# Patient Record
Sex: MALE | Race: BLACK OR AFRICAN AMERICAN | NOT HISPANIC OR LATINO | URBAN - METROPOLITAN AREA
[De-identification: names, ages, dates, MRNs, and addresses within clinical notes are randomized per-mention and may not be internally consistent; named-entity substitution may affect disease eponyms.]

---

## 2022-01-20 VITALS
HEART RATE: 93 BPM | DIASTOLIC BLOOD PRESSURE: 89 MMHG | OXYGEN SATURATION: 100 % | HEIGHT: 72 IN | RESPIRATION RATE: 16 BRPM | SYSTOLIC BLOOD PRESSURE: 166 MMHG | TEMPERATURE: 97 F | WEIGHT: 220.9 LBS

## 2022-01-20 RX ORDER — CHLORHEXIDINE GLUCONATE 213 G/1000ML
1 SOLUTION TOPICAL ONCE
Refills: 0 | Status: DISCONTINUED | OUTPATIENT
Start: 2022-02-16 | End: 2022-02-17

## 2022-01-20 NOTE — H&P ADULT - NSICDXPASTMEDICALHX_GEN_ALL_CORE_FT
PAST MEDICAL HISTORY:  CAD (coronary artery disease)     Chronic diastolic congestive heart failure     HTN (hypertension)     PAD (peripheral artery disease)

## 2022-01-20 NOTE — H&P ADULT - HISTORY OF PRESENT ILLNESS
COVID: [facility]   Cardiologist: Dr. Chen   Pharmacy: [facility] [phone]  Escort:     71 Y M former smoker with pmh PMH afib (Eliquis last dose____), HLD, CAD, HFpEF, PAD (s/p LLE SFA/pop/DANIE/pop/DANIE PTA, PCI SFA, DCB SFA and pop 12/23/2021 @[facility] cardiology office; RSFA severe disease BTK) who presented to their cardiologist c/o R severe calf pain/heaviness with ambulation 1-3 blocks for days. Symptoms are impairing walking/working/performing ADLs.  Pt also endorsing intermittent substernal, non radiating gradually worsening chest rpessure with moderate exertion x few weeks.   Denies ZAPATA, orthopnea, PND, diaphoresis, palpitations, dizziness, syncope, N/V/D, fever, chills, melena, hematuria.     In light of risk factors, [place] category 4, grade 2 symptoms, and known PAD pt recommended for peripheral angiogram.     Peripheral cath 12/23/21: R external iliac <30%, L external iliac 70-80%, L pSFA 100%, mSFA 80-90%, dSFA 80%, L pop 80-90% calcified, L pATA 90%, L PTA mild diffuse, L peroneal mild diffuse. Successful L sided atherectomy/PTA SFA/pop/DANIE/pop/DANIE , PCI SFA, DCB SFA and pop.    COVID: [facility] negative in chart  Cardiologist: Dr. Chen   Pharmacy: [facility] [phone]  Escort: TBD    71 Y M former smoker with pmh PMH afib (Eliquis last dose 02/14), HLD, CAD, HFpEF, PAD (s/p LLE SFA/pop/DANIE/pop/DANIE PTA, PCI SFA, DCB SFA and pop [facility] RSFA severe disease BTK) who presented to their cardiologist c/o R severe calf pain/heaviness with ambulation 1-3 blocks for days. Symptoms are impairing walking/working/performing ADLs.  Pt also endorsing intermittent substernal, non radiating gradually worsening chest rpessure with moderate exertion x few weeks.   Denies ZAPATA, orthopnea, PND, diaphoresis, palpitations, dizziness, syncope, N/V/D, fever, chills, melena, hematuria.     In light of risk factors, Arik category 4, grade 2 symptoms, and known PAD pt recommended for peripheral angiogram.     Peripheral cath 12/23/21: R external iliac <30%, L external iliac 70-80%, L pSFA 100%, mSFA 80-90%, dSFA 80%, L pop 80-90% calcified, L pATA 90%, L PTA mild diffuse, L peroneal mild diffuse. Successful L sided atherectomy/PTA SFA/pop/DANIE/pop/DANIE , PCI SFA, DCB SFA and pop.    COVID: [facility] negative in chart  Cardiologist: Dr. Chen   Pharmacy: [facility] [phone]  Escort: friend     71 Y M former smoker with pmh PMH afib (Eliquis last dose 02/14), HLD, CAD, HFpEF, PAD (s/p LLE SFA/pop/DANIE/pop/DANIE PTA, PCI SFA, DCB SFA and pop 12/23/2021 @[facility] cardiology office; RSFA severe disease BTK) who presented to their cardiologist c/o R severe calf pain/heaviness with ambulation 1-3 blocks for days. Symptoms are impairing walking/working/performing ADLs.  Pt also endorsing intermittent substernal, non radiating gradually worsening chest rpessure with moderate exertion x few weeks.   Denies ZAPATA, orthopnea, PND, diaphoresis, palpitations, dizziness, syncope, N/V/D, fever, chills, melena, hematuria.     In light of risk factors, [place] category 4, grade 2 symptoms, and known PAD pt recommended for peripheral angiogram.     Peripheral cath 12/23/21: R external iliac <30%, L external iliac 70-80%, L pSFA 100%, mSFA 80-90%, dSFA 80%, L pop 80-90% calcified, L pATA 90%, L PTA mild diffuse, L peroneal mild diffuse. Successful L sided atherectomy/PTA SFA/pop/DANIE/pop/DANIE , PCI SFA, DCB SFA and pop.

## 2022-01-20 NOTE — H&P ADULT - RS GEN PE MLT RESP DETAILS PC
MEDICATIONS  (STANDING):  aspirin enteric coated 81 milliGRAM(s) Oral daily  clopidogrel Tablet 75 milliGRAM(s) Oral daily  levothyroxine 75 MICROGram(s) Oral daily    MEDICATIONS  (PRN):  ALBUTerol    90 MICROgram(s) HFA Inhaler 2 Puff(s) Inhalation every 6 hours PRN wheezing  clonazePAM  Tablet 1 milliGRAM(s) Oral at bedtime PRN insomnia  clonazePAM  Tablet 1 milliGRAM(s) Oral at bedtime PRN severe insomnia  OLANZapine 2.5 milliGRAM(s) Oral every 6 hours PRN agitation  OLANZapine Injectable 2.5 milliGRAM(s) IntraMuscular once PRN extreme agitation  ondansetron    Tablet 4 milliGRAM(s) Oral every 6 hours PRN Nausea  oxyCODONE    IR 10 milliGRAM(s) Oral two times a day PRN severe pain   normal/clear to auscultation bilaterally

## 2022-02-16 ENCOUNTER — INPATIENT (INPATIENT)
Facility: HOSPITAL | Age: 72
LOS: 0 days | Discharge: HOME | DRG: 252 | End: 2022-02-17
Attending: INTERNAL MEDICINE | Admitting: INTERNAL MEDICINE
Payer: MEDICARE

## 2022-02-16 LAB
A1C WITH ESTIMATED AVERAGE GLUCOSE RESULT: 6.6 % — HIGH (ref 4–5.6)
ALBUMIN SERPL ELPH-MCNC: 4.1 G/DL — SIGNIFICANT CHANGE UP (ref 3.3–5)
ALBUMIN SERPL ELPH-MCNC: 4.5 G/DL — SIGNIFICANT CHANGE UP (ref 3.3–5)
ALP SERPL-CCNC: 147 U/L — HIGH (ref 40–120)
ALP SERPL-CCNC: 157 U/L — HIGH (ref 40–120)
ALT FLD-CCNC: 16 U/L — SIGNIFICANT CHANGE UP (ref 10–45)
ALT FLD-CCNC: 19 U/L — SIGNIFICANT CHANGE UP (ref 10–45)
ANION GAP SERPL CALC-SCNC: 11 MMOL/L — SIGNIFICANT CHANGE UP (ref 5–17)
ANION GAP SERPL CALC-SCNC: 12 MMOL/L — SIGNIFICANT CHANGE UP (ref 5–17)
APTT BLD: 181.4 SEC — CRITICAL HIGH (ref 27.5–35.5)
APTT BLD: 56.6 SEC — HIGH (ref 27.5–35.5)
AST SERPL-CCNC: 18 U/L — SIGNIFICANT CHANGE UP (ref 10–40)
AST SERPL-CCNC: 19 U/L — SIGNIFICANT CHANGE UP (ref 10–40)
BASOPHILS # BLD AUTO: 0.07 K/UL — SIGNIFICANT CHANGE UP (ref 0–0.2)
BASOPHILS # BLD AUTO: 0.07 K/UL — SIGNIFICANT CHANGE UP (ref 0–0.2)
BASOPHILS NFR BLD AUTO: 0.7 % — SIGNIFICANT CHANGE UP (ref 0–2)
BASOPHILS NFR BLD AUTO: 0.8 % — SIGNIFICANT CHANGE UP (ref 0–2)
BILIRUB DIRECT SERPL-MCNC: 0.4 MG/DL — HIGH (ref 0–0.3)
BILIRUB INDIRECT FLD-MCNC: 2.6 MG/DL — HIGH (ref 0.2–1)
BILIRUB SERPL-MCNC: 2.9 MG/DL — HIGH (ref 0.2–1.2)
BILIRUB SERPL-MCNC: 3 MG/DL — HIGH (ref 0.2–1.2)
BILIRUB SERPL-MCNC: 3.1 MG/DL — HIGH (ref 0.2–1.2)
BUN SERPL-MCNC: 11 MG/DL — SIGNIFICANT CHANGE UP (ref 7–23)
BUN SERPL-MCNC: 11 MG/DL — SIGNIFICANT CHANGE UP (ref 7–23)
CALCIUM SERPL-MCNC: 8.8 MG/DL — SIGNIFICANT CHANGE UP (ref 8.4–10.5)
CALCIUM SERPL-MCNC: 9.5 MG/DL — SIGNIFICANT CHANGE UP (ref 8.4–10.5)
CHLORIDE SERPL-SCNC: 103 MMOL/L — SIGNIFICANT CHANGE UP (ref 96–108)
CHLORIDE SERPL-SCNC: 98 MMOL/L — SIGNIFICANT CHANGE UP (ref 96–108)
CHOLEST SERPL-MCNC: 126 MG/DL — SIGNIFICANT CHANGE UP
CK MB CFR SERPL CALC: 2.7 NG/ML — SIGNIFICANT CHANGE UP (ref 0–6.7)
CK SERPL-CCNC: 66 U/L — SIGNIFICANT CHANGE UP (ref 30–200)
CO2 SERPL-SCNC: 24 MMOL/L — SIGNIFICANT CHANGE UP (ref 22–31)
CO2 SERPL-SCNC: 27 MMOL/L — SIGNIFICANT CHANGE UP (ref 22–31)
CREAT SERPL-MCNC: 0.96 MG/DL — SIGNIFICANT CHANGE UP (ref 0.5–1.3)
CREAT SERPL-MCNC: 1.02 MG/DL — SIGNIFICANT CHANGE UP (ref 0.5–1.3)
EOSINOPHIL # BLD AUTO: 0.03 K/UL — SIGNIFICANT CHANGE UP (ref 0–0.5)
EOSINOPHIL # BLD AUTO: 0.09 K/UL — SIGNIFICANT CHANGE UP (ref 0–0.5)
EOSINOPHIL NFR BLD AUTO: 0.3 % — SIGNIFICANT CHANGE UP (ref 0–6)
EOSINOPHIL NFR BLD AUTO: 1 % — SIGNIFICANT CHANGE UP (ref 0–6)
ESTIMATED AVERAGE GLUCOSE: 143 MG/DL — HIGH (ref 68–114)
GLUCOSE BLDC GLUCOMTR-MCNC: 170 MG/DL — HIGH (ref 70–99)
GLUCOSE SERPL-MCNC: 170 MG/DL — HIGH (ref 70–99)
GLUCOSE SERPL-MCNC: 203 MG/DL — HIGH (ref 70–99)
HAPTOGLOB SERPL-MCNC: 101 MG/DL — SIGNIFICANT CHANGE UP (ref 34–200)
HCT VFR BLD CALC: 41 % — SIGNIFICANT CHANGE UP (ref 39–50)
HCT VFR BLD CALC: 41.7 % — SIGNIFICANT CHANGE UP (ref 39–50)
HDLC SERPL-MCNC: 40 MG/DL — LOW
HGB BLD-MCNC: 14.5 G/DL — SIGNIFICANT CHANGE UP (ref 13–17)
HGB BLD-MCNC: 15.4 G/DL — SIGNIFICANT CHANGE UP (ref 13–17)
IMM GRANULOCYTES NFR BLD AUTO: 0.2 % — SIGNIFICANT CHANGE UP (ref 0–1.5)
IMM GRANULOCYTES NFR BLD AUTO: 0.3 % — SIGNIFICANT CHANGE UP (ref 0–1.5)
INR BLD: 1.22 — HIGH (ref 0.88–1.16)
INR BLD: 1.33 — HIGH (ref 0.88–1.16)
LACTATE SERPL-SCNC: 1.7 MMOL/L — SIGNIFICANT CHANGE UP (ref 0.5–2)
LDH SERPL L TO P-CCNC: 237 U/L — SIGNIFICANT CHANGE UP (ref 50–242)
LIPID PNL WITH DIRECT LDL SERPL: 68 MG/DL — SIGNIFICANT CHANGE UP
LYMPHOCYTES # BLD AUTO: 1.91 K/UL — SIGNIFICANT CHANGE UP (ref 1–3.3)
LYMPHOCYTES # BLD AUTO: 20 % — SIGNIFICANT CHANGE UP (ref 13–44)
LYMPHOCYTES # BLD AUTO: 3.49 K/UL — HIGH (ref 1–3.3)
LYMPHOCYTES # BLD AUTO: 38.6 % — SIGNIFICANT CHANGE UP (ref 13–44)
MAGNESIUM SERPL-MCNC: 1.6 MG/DL — SIGNIFICANT CHANGE UP (ref 1.6–2.6)
MCHC RBC-ENTMCNC: 32.9 PG — SIGNIFICANT CHANGE UP (ref 27–34)
MCHC RBC-ENTMCNC: 34.2 PG — HIGH (ref 27–34)
MCHC RBC-ENTMCNC: 35.4 GM/DL — SIGNIFICANT CHANGE UP (ref 32–36)
MCHC RBC-ENTMCNC: 36.9 GM/DL — HIGH (ref 32–36)
MCV RBC AUTO: 92.7 FL — SIGNIFICANT CHANGE UP (ref 80–100)
MCV RBC AUTO: 93 FL — SIGNIFICANT CHANGE UP (ref 80–100)
MONOCYTES # BLD AUTO: 0.65 K/UL — SIGNIFICANT CHANGE UP (ref 0–0.9)
MONOCYTES # BLD AUTO: 0.86 K/UL — SIGNIFICANT CHANGE UP (ref 0–0.9)
MONOCYTES NFR BLD AUTO: 6.8 % — SIGNIFICANT CHANGE UP (ref 2–14)
MONOCYTES NFR BLD AUTO: 9.5 % — SIGNIFICANT CHANGE UP (ref 2–14)
NEUTROPHILS # BLD AUTO: 4.51 K/UL — SIGNIFICANT CHANGE UP (ref 1.8–7.4)
NEUTROPHILS # BLD AUTO: 6.85 K/UL — SIGNIFICANT CHANGE UP (ref 1.8–7.4)
NEUTROPHILS NFR BLD AUTO: 49.9 % — SIGNIFICANT CHANGE UP (ref 43–77)
NEUTROPHILS NFR BLD AUTO: 71.9 % — SIGNIFICANT CHANGE UP (ref 43–77)
NON HDL CHOLESTEROL: 86 MG/DL — SIGNIFICANT CHANGE UP
NRBC # BLD: 0 /100 WBCS — SIGNIFICANT CHANGE UP (ref 0–0)
NRBC # BLD: 0 /100 WBCS — SIGNIFICANT CHANGE UP (ref 0–0)
NT-PROBNP SERPL-SCNC: 3468 PG/ML — HIGH (ref 0–300)
PHOSPHATE SERPL-MCNC: 2 MG/DL — LOW (ref 2.5–4.5)
PLATELET # BLD AUTO: 303 K/UL — SIGNIFICANT CHANGE UP (ref 150–400)
PLATELET # BLD AUTO: 310 K/UL — SIGNIFICANT CHANGE UP (ref 150–400)
POTASSIUM SERPL-MCNC: 3.5 MMOL/L — SIGNIFICANT CHANGE UP (ref 3.5–5.3)
POTASSIUM SERPL-MCNC: 3.7 MMOL/L — SIGNIFICANT CHANGE UP (ref 3.5–5.3)
POTASSIUM SERPL-SCNC: 3.5 MMOL/L — SIGNIFICANT CHANGE UP (ref 3.5–5.3)
POTASSIUM SERPL-SCNC: 3.7 MMOL/L — SIGNIFICANT CHANGE UP (ref 3.5–5.3)
PROT SERPL-MCNC: 7.5 G/DL — SIGNIFICANT CHANGE UP (ref 6–8.3)
PROT SERPL-MCNC: 8 G/DL — SIGNIFICANT CHANGE UP (ref 6–8.3)
PROTHROM AB SERPL-ACNC: 14.5 SEC — HIGH (ref 10.6–13.6)
PROTHROM AB SERPL-ACNC: 15.7 SEC — HIGH (ref 10.6–13.6)
RBC # BLD: 4.41 M/UL — SIGNIFICANT CHANGE UP (ref 4.2–5.8)
RBC # BLD: 4.5 M/UL — SIGNIFICANT CHANGE UP (ref 4.2–5.8)
RBC # FLD: 12.6 % — SIGNIFICANT CHANGE UP (ref 10.3–14.5)
RBC # FLD: 13.1 % — SIGNIFICANT CHANGE UP (ref 10.3–14.5)
SARS-COV-2 RNA SPEC QL NAA+PROBE: SIGNIFICANT CHANGE UP
SODIUM SERPL-SCNC: 136 MMOL/L — SIGNIFICANT CHANGE UP (ref 135–145)
SODIUM SERPL-SCNC: 139 MMOL/L — SIGNIFICANT CHANGE UP (ref 135–145)
TRIGL SERPL-MCNC: 90 MG/DL — SIGNIFICANT CHANGE UP
WBC # BLD: 9.04 K/UL — SIGNIFICANT CHANGE UP (ref 3.8–10.5)
WBC # BLD: 9.54 K/UL — SIGNIFICANT CHANGE UP (ref 3.8–10.5)
WBC # FLD AUTO: 9.04 K/UL — SIGNIFICANT CHANGE UP (ref 3.8–10.5)
WBC # FLD AUTO: 9.54 K/UL — SIGNIFICANT CHANGE UP (ref 3.8–10.5)

## 2022-02-16 PROCEDURE — 75716 ARTERY X-RAYS ARMS/LEGS: CPT | Mod: 26,XU

## 2022-02-16 PROCEDURE — 37226: CPT | Mod: RT

## 2022-02-16 PROCEDURE — 37228: CPT | Mod: RT

## 2022-02-16 PROCEDURE — 99291 CRITICAL CARE FIRST HOUR: CPT | Mod: 57

## 2022-02-16 PROCEDURE — 93010 ELECTROCARDIOGRAM REPORT: CPT

## 2022-02-16 PROCEDURE — 71045 X-RAY EXAM CHEST 1 VIEW: CPT | Mod: 26

## 2022-02-16 PROCEDURE — 37221: CPT | Mod: LT

## 2022-02-16 RX ORDER — SODIUM CHLORIDE 9 MG/ML
500 INJECTION INTRAMUSCULAR; INTRAVENOUS; SUBCUTANEOUS
Refills: 0 | Status: DISCONTINUED | OUTPATIENT
Start: 2022-02-16 | End: 2022-02-17

## 2022-02-16 RX ORDER — CLOPIDOGREL BISULFATE 75 MG/1
75 TABLET, FILM COATED ORAL ONCE
Refills: 0 | Status: COMPLETED | OUTPATIENT
Start: 2022-02-16 | End: 2022-02-16

## 2022-02-16 RX ORDER — DEXTROSE 50 % IN WATER 50 %
15 SYRINGE (ML) INTRAVENOUS ONCE
Refills: 0 | Status: ACTIVE | OUTPATIENT
Start: 2022-02-16 | End: 2023-01-15

## 2022-02-16 RX ORDER — GLUCAGON INJECTION, SOLUTION 0.5 MG/.1ML
1 INJECTION, SOLUTION SUBCUTANEOUS ONCE
Refills: 0 | Status: ACTIVE | OUTPATIENT
Start: 2022-02-16 | End: 2023-01-15

## 2022-02-16 RX ORDER — SODIUM CHLORIDE 9 MG/ML
1000 INJECTION, SOLUTION INTRAVENOUS
Refills: 0 | Status: ACTIVE | OUTPATIENT
Start: 2022-02-16 | End: 2023-01-15

## 2022-02-16 RX ORDER — POTASSIUM CHLORIDE 20 MEQ
40 PACKET (EA) ORAL ONCE
Refills: 0 | Status: COMPLETED | OUTPATIENT
Start: 2022-02-16 | End: 2022-02-16

## 2022-02-16 RX ORDER — CLOPIDOGREL BISULFATE 75 MG/1
75 TABLET, FILM COATED ORAL DAILY
Refills: 0 | Status: ACTIVE | OUTPATIENT
Start: 2022-02-17 | End: 2023-01-16

## 2022-02-16 RX ORDER — ASPIRIN/CALCIUM CARB/MAGNESIUM 324 MG
81 TABLET ORAL DAILY
Refills: 0 | Status: DISCONTINUED | OUTPATIENT
Start: 2022-02-17 | End: 2022-02-17

## 2022-02-16 RX ORDER — FUROSEMIDE 40 MG
40 TABLET ORAL ONCE
Refills: 0 | Status: COMPLETED | OUTPATIENT
Start: 2022-02-16 | End: 2022-02-16

## 2022-02-16 RX ORDER — NITROGLYCERIN 6.5 MG
50 CAPSULE, EXTENDED RELEASE ORAL
Qty: 50 | Refills: 0 | Status: DISCONTINUED | OUTPATIENT
Start: 2022-02-16 | End: 2022-02-16

## 2022-02-16 RX ORDER — INFLUENZA VIRUS VACCINE 15; 15; 15; 15 UG/.5ML; UG/.5ML; UG/.5ML; UG/.5ML
0.7 SUSPENSION INTRAMUSCULAR ONCE
Refills: 0 | Status: ACTIVE | OUTPATIENT
Start: 2022-02-16 | End: 2023-01-15

## 2022-02-16 RX ORDER — DEXTROSE 50 % IN WATER 50 %
25 SYRINGE (ML) INTRAVENOUS ONCE
Refills: 0 | Status: ACTIVE | OUTPATIENT
Start: 2022-02-16

## 2022-02-16 RX ORDER — INSULIN LISPRO 100/ML
VIAL (ML) SUBCUTANEOUS
Refills: 0 | Status: ACTIVE | OUTPATIENT
Start: 2022-02-16 | End: 2023-01-15

## 2022-02-16 RX ORDER — POTASSIUM PHOSPHATE, MONOBASIC POTASSIUM PHOSPHATE, DIBASIC 236; 224 MG/ML; MG/ML
30 INJECTION, SOLUTION INTRAVENOUS ONCE
Refills: 0 | Status: COMPLETED | OUTPATIENT
Start: 2022-02-16 | End: 2022-02-16

## 2022-02-16 RX ORDER — DEXTROSE 50 % IN WATER 50 %
12.5 SYRINGE (ML) INTRAVENOUS ONCE
Refills: 0 | Status: ACTIVE | OUTPATIENT
Start: 2022-02-16

## 2022-02-16 RX ORDER — NITROGLYCERIN 6.5 MG
50 CAPSULE, EXTENDED RELEASE ORAL
Qty: 50 | Refills: 0 | Status: DISCONTINUED | OUTPATIENT
Start: 2022-02-16 | End: 2022-02-17

## 2022-02-16 RX ORDER — ATORVASTATIN CALCIUM 80 MG/1
80 TABLET, FILM COATED ORAL AT BEDTIME
Refills: 0 | Status: ACTIVE | OUTPATIENT
Start: 2022-02-16 | End: 2023-01-15

## 2022-02-16 RX ORDER — MAGNESIUM SULFATE 500 MG/ML
2 VIAL (ML) INJECTION ONCE
Refills: 0 | Status: COMPLETED | OUTPATIENT
Start: 2022-02-16 | End: 2022-02-16

## 2022-02-16 RX ADMIN — SODIUM CHLORIDE 300 MILLILITER(S): 9 INJECTION INTRAMUSCULAR; INTRAVENOUS; SUBCUTANEOUS at 12:34

## 2022-02-16 RX ADMIN — ATORVASTATIN CALCIUM 80 MILLIGRAM(S): 80 TABLET, FILM COATED ORAL at 22:05

## 2022-02-16 RX ADMIN — Medication 25 GRAM(S): at 22:39

## 2022-02-16 RX ADMIN — POTASSIUM PHOSPHATE, MONOBASIC POTASSIUM PHOSPHATE, DIBASIC 83.33 MILLIMOLE(S): 236; 224 INJECTION, SOLUTION INTRAVENOUS at 23:21

## 2022-02-16 RX ADMIN — CLOPIDOGREL BISULFATE 75 MILLIGRAM(S): 75 TABLET, FILM COATED ORAL at 12:33

## 2022-02-16 RX ADMIN — Medication 15 MICROGRAM(S)/MIN: at 19:17

## 2022-02-16 RX ADMIN — Medication 15 MICROGRAM(S)/MIN: at 19:51

## 2022-02-16 RX ADMIN — Medication 40 MILLIEQUIVALENT(S): at 12:33

## 2022-02-16 NOTE — PATIENT PROFILE ADULT - FALL HARM RISK - HARM RISK INTERVENTIONS
Assistance with ambulation/Assistance OOB with selected safe patient handling equipment/Communicate Risk of Fall with Harm to all staff/Monitor gait and stability/Reinforce activity limits and safety measures with patient and family/Review medications for side effects contributing to fall risk/Sit up slowly, dangle for a short time, stand at bedside before walking/Tailored Fall Risk Interventions/Toileting schedule using arm’s reach rule for commode and bathroom/Use of alarms - bed, chair and/or voice tab/Visual Cue: Yellow wristband and red socks/Bed in lowest position, wheels locked, appropriate side rails in place/Call bell, personal items and telephone in reach/Instruct patient to call for assistance before getting out of bed or chair/Non-slip footwear when patient is out of bed/[place] to call system/Physically safe environment - no spills, clutter or unnecessary equipment/Purposeful Proactive Rounding/Room/bathroom lighting operational, light cord in reach

## 2022-02-16 NOTE — PROGRESS NOTE ADULT - ASSESSMENT
71 Y M former smoker with pmh PMH afib (Eliquis last dose 02/14), HLD, CAD, HFrEF, PAD (s/p LLE SFA/pop/DANIE/pop/DANIE PTA, PCI SFA, DCB SFA and pop 12/23/2021 @[facility] cardiology office; RSFA severe disease BTK) who presented to their cardiologist c/o R severe calf pain/heaviness with ambulation 1-3 blocks for days. Patient came in to hospital on 2/16 for elective peripheral vascular case of RSFA, had successful stenting completed when he became hypertensive, dyspneic and hypoxic. During this time noted to be hypertensive to the 240s for systolic, tachycardic and hypervolemic. Patient transferred to CCU s/p peripheral vascular stenting  for further management of HTN emergency and heart failure exacerbation.       NEURO:  #AOX3: No active issues     CV:   71 Y M former smoker with PMH A.fib, HLD, CAD, HFpEF, PAD (s/p LLE SFA/pop/DANIE/pop/DANIE PTA, PCI SFA, DCB SFA and pop 12/23/2021 @[facility] cardiology office; RSFA severe disease BTK) who presents for peripheral catheterization secondary to [place] IV symptoms in the setting of known hx of PAD.     #PAD (s/p LLE SFA/pop/DANIE/pop/DANIE PTA, PCI SFA, DCB SFA and pop 12/23/2021 @[facility] cardiology office; RSFA severe disease BTK)  -s/p RSFA stent and LExt Iliac GE Stent  -monitor Left groin site for bleeding  -needs to lie flat for 2 hours  -c/w ASA 81mg and Plavix 75mg tomorrow morning (received dose today)   -Atorvastatin 80mg qhs (lipid panel wnl)  -DASH diet    #HTN emergency: Patient became HTN to the 240s, dyspneic, hypoxic during vascular cath procedure  -C/w Nitroglycerin ggt   -Received 80mg IVP Lasix, monitor Uop  -BP goals: <180/100 systolic till 9pm (2/16)  -Overnight: <160s     #A-fib: home meds Eliquis 5mg BID, EKG: Atrial fibrillation @ 81bpm, LAFB, TWI V4-V6  - f/u EKG in AM  - Hold Eliquis today, possible restart tomorrow 2/16    #HF with reduced EF 40%, takes Lasix   Pt with hx of HFEF, most recent echo demonstrating EF 40%.  - received Lasix 80mg IVP  - strict I/Os, monitor UOP carefully  -caution with fluids  -repeat TTE (limited) and full TTE     PULM: former smoker of 1/2-1 PPD X 30 years  #Hypoxemia 2/2 HTN emergency  -currently on NRB mask saturating 100%  -titrate down to NC   -F/u ABG    GI: h/o Colon resection (20 years ago- unsure of etiology)   #Elevated Bili & ALP  -rule out RBC hemolysis   #Diet: DASH    RENAL/: No active issues    ENDO: HbA1c: 6.6  #Pre-Diabetic  -mISS    ID: No active issues    F: tolerating PO, no IVF  E: replete K<4, Mg<2  N: Dash/TLC    VTE Prophylaxis: Eliquis (home meds)  GI: not needed  C: Full Code

## 2022-02-16 NOTE — PROGRESS NOTE ADULT - ATTENDING COMMENTS
Please see housestaff note for full details.  I have reviewed the case, examined the patient and agree with plan.  HTN CHF PVD with CHF and HTN emergency post post PCI for PAD.  Continue diuresis.  Continue BP meds. Will follow.

## 2022-02-16 NOTE — CHART NOTE - NSCHARTNOTEFT_GEN_A_CORE
Patient refused mISS dose. I explained to patient the potential risks involved in forgoing the recommended treatment including DKA and death. Patient verbalized understanding of these risks. After our discussion, patient remained steadfast in refusing treatment. On my assessment, patient was alert and oriented and able to engage in conversation and answer questions appropriately. Patient refused bedtime mISS dose. I explained to patient the potential risks involved in forgoing the recommended treatment including but not limited to DKA and death. Patient verbalized understanding of these risks. After our discussion, patient remained steadfast in refusing treatment. On my assessment, patient was alert and oriented and able to engage in conversation and answer questions appropriately.

## 2022-02-16 NOTE — PROGRESS NOTE ADULT - SUBJECTIVE AND OBJECTIVE BOX
HOSPITAL COURSE:  70 y/o M former smoker with PMH of fib (Eliquis last dose 02/14), HLD, CAD, HFrEF, PAD (s/p LLE SFA/pop/DANIE/pop/DANIE PTA, PCI SFA, DCB SFA and pop 12/23/2021 @[facility] cardiology office; RSFA severe disease BTK) who presented to their cardiologist c/o R severe calf pain/heaviness with ambulation 1-3 blocks for days. Symptoms are impairing walking/working/performing activities of daily living.  Pt also endorsing increase dyspnea and intermittent substernal, non radiating gradually worsening chest pressure with moderate exertion for few weeks. Patient came in to hospital on 2/16 for elective peripheral vascular angiogram and had successful GE placed in Right SFA and Left external iliac. During the procedure patient became hypertensive, dyspneic and hypoxic. During this time noted to be hypertensive to the 240s systolic, tachycardic and hypervolemic. Patient given SL Nitroglycerin, Lasix 80mg IVP, and started on Nitroglycerin drip. Patient transferred to CCU for further management of HTN emergency and heart failure exacerbation.     In light of risk factors, Arik category 4, grade 2 symptoms, and known PAD pt recommended for peripheral angiogram.     Peripheral cath 12/23/21: R external iliac <30%, L external iliac 70-80%, L pSFA 100%, mSFA 80-90%, dSFA 80%, L pop 80-90% calcified, L pATA 90%, L PTA mild diffuse, L peroneal mild diffuse. Successful L sided atherectomy/PTA SFA/pop/DANIE/pop/DANIE , PCI SFA, DCB SFA and pop.     Allergies: NKDA, develops itching when taking Plavix  Meds: Eliquis 5mg BID, Carvedilol 25mg BID, Entresto 49-51mg, Metolazone when needed (every 2 weeks) along with Lasix 40mg  SxH: Former smoker of 1/2-1 PPD X 30 years, occasional alcohol use, no IVDU    Vitals upon arrival to CCU: BP: 184/90, RR;25-28, HR: 100-105, SpO2 100% on NRB    EKG: atrial fibrillation @ 81bpm, LAFB, TWI V4-V6      Vital Signs Last 12 Hrs  T(F): 97 (02-16-22 @ 16:48), Max: 97 (02-16-22 @ 16:48)  HR: 99 (02-16-22 @ 17:15) (99 - 106)  BP: 167/87 (02-16-22 @ 17:15) (155/89 - 173/89)  BP(mean): 117 (02-16-22 @ 17:15) (114 - 127)  RR: 27 (02-16-22 @ 17:15) (27 - 30)  SpO2: 100% (02-16-22 @ 17:15) (100% - 100%)  I&O's Summary      PHYSICAL EXAM:  Constitutional: NAD, comfortable in bed.   HEENT: NC/AT, PERRLA, EOMI, no conjunctival pallor or scleral icterus, MMM  Neck: Supple, no JVD  Respiratory: Crackles bilateral in lower lobes   Cardiovascular: Irregular rate and rhythm, no MRG  Gastrointestinal: +BS, soft NTND, no guarding or rebound tenderness, no palpable masses   Extremities: wwp; 2+ pitting edema on ankles b/l   Vascular: Pulses 1+ throughout   Neurological: AAOx3, no CN deficits, strength and sensation intact throughout.   Skin: Left groin site access- no bleeding         LABS:                        15.4   9.04  )-----------( 310      ( 16 Feb 2022 11:52 )             41.7     02-16    136  |  98  |  11  ----------------------------<  170<H>  3.7   |  27  |  1.02    Ca    9.5      16 Feb 2022 11:52    TPro  8.0  /  Alb  4.5  /  TBili  3.0<H>  /  DBili  x   /  AST  19  /  ALT  16  /  AlkPhos  157<H>  02-16    PT/INR - ( 16 Feb 2022 11:52 )   PT: 14.5 sec;   INR: 1.22          PTT - ( 16 Feb 2022 11:52 )  PTT:181.4 sec        RADIOLOGY & ADDITIONAL TESTS:    MEDICATIONS  (STANDING):  atorvastatin 80 milliGRAM(s) Oral at bedtime  chlorhexidine 4% Liquid 1 Application(s) Topical once  dextrose 40% Gel 15 Gram(s) Oral once  dextrose 5%. 1000 milliLiter(s) (50 mL/Hr) IV Continuous <Continuous>  dextrose 5%. 1000 milliLiter(s) (100 mL/Hr) IV Continuous <Continuous>  dextrose 50% Injectable 25 Gram(s) IV Push once  dextrose 50% Injectable 12.5 Gram(s) IV Push once  dextrose 50% Injectable 25 Gram(s) IV Push once  glucagon  Injectable 1 milliGRAM(s) IntraMuscular once  insulin lispro (ADMELOG) corrective regimen sliding scale   SubCutaneous Before meals and at bedtime  sodium chloride 0.9%. 500 milliLiter(s) (300 mL/Hr) IV Continuous <Continuous>    MEDICATIONS  (PRN):   HOSPITAL COURSE:  70 y/o M former smoker with PMH of fib (Eliquis last dose 02/14), HLD, CAD, HFrEF, PAD (s/p LLE SFA/pop/DANIE/pop/DANIE PTA, PCI SFA, DCB SFA and pop 12/23/2021 @[facility] cardiology office; RSFA severe disease BTK) who presented to their cardiologist c/o R severe calf pain/heaviness with ambulation 1-3 blocks for days. Symptoms are impairing walking/working/performing activities of daily living.  Pt also endorsing increase dyspnea and intermittent substernal, non radiating gradually worsening chest pressure with moderate exertion for few weeks. Patient came in to hospital on 2/16 for elective peripheral vascular angiogram and had successful GE placed in Right SFA and Left external iliac. During the procedure patient became hypertensive, dyspneic and hypoxic. During this time noted to be hypertensive to the 240s systolic, tachycardic and hypervolemic (flash pulmonary edema). Patient given SL Nitroglycerin, Lasix 80mg IVP, and started on Nitroglycerin drip. Patient transferred to CCU for further management of HTN emergency and heart failure exacerbation.     In light of risk factors, Ari[place] category 4, grade 2 symptoms, and known PAD pt recommended for peripheral angiogram.     Peripheral cath 12/23/21: R external iliac <30%, L external iliac 70-80%, L pSFA 100%, mSFA 80-90%, dSFA 80%, L pop 80-90% calcified, L pATA 90%, L PTA mild diffuse, L peroneal mild diffuse. Successful L sided atherectomy/PTA SFA/pop/DANIE/pop/DANIE , PCI SFA, DCB SFA and pop.     Allergies: NKDA, develops itching when taking Plavix  Meds: Eliquis 5mg BID, Carvedilol 25mg BID, Entresto 49-51mg, Metolazone when needed (every 2 weeks) along with Lasix 40mg  SxH: Former smoker of 1/2-1 PPD X 30 years, occasional alcohol use, no IVDU    Vitals upon arrival to CCU: BP: 184/90, RR;25-28, HR: 100-105, SpO2 100% on NRB    EKG: atrial fibrillation @ 81bpm, LAFB, TWI V4-V6      Vital Signs Last 12 Hrs  T(F): 97 (02-16-22 @ 16:48), Max: 97 (02-16-22 @ 16:48)  HR: 99 (02-16-22 @ 17:15) (99 - 106)  BP: 167/87 (02-16-22 @ 17:15) (155/89 - 173/89)  BP(mean): 117 (02-16-22 @ 17:15) (114 - 127)  RR: 27 (02-16-22 @ 17:15) (27 - 30)  SpO2: 100% (02-16-22 @ 17:15) (100% - 100%)  I&O's Summary      PHYSICAL EXAM:  Constitutional: NAD, comfortable in bed.   HEENT: NC/AT, PERRLA, EOMI, no conjunctival pallor or scleral icterus, MMM  Neck: Supple, no JVD  Respiratory: Crackles bilateral in lower lobes   Cardiovascular: Irregular rate and rhythm, no MRG  Gastrointestinal: +BS, soft NTND, no guarding or rebound tenderness, no palpable masses   Extremities: wwp; 2+ pitting edema on ankles b/l   Vascular: Pulses 1+ throughout   Neurological: AAOx3, no CN deficits, strength and sensation intact throughout.   Skin: Left groin site access- no bleeding         LABS:                        15.4   9.04  )-----------( 310      ( 16 Feb 2022 11:52 )             41.7     02-16    136  |  98  |  11  ----------------------------<  170<H>  3.7   |  27  |  1.02    Ca    9.5      16 Feb 2022 11:52    TPro  8.0  /  Alb  4.5  /  TBili  3.0<H>  /  DBili  x   /  AST  19  /  ALT  16  /  AlkPhos  157<H>  02-16    PT/INR - ( 16 Feb 2022 11:52 )   PT: 14.5 sec;   INR: 1.22          PTT - ( 16 Feb 2022 11:52 )  PTT:181.4 sec        RADIOLOGY & ADDITIONAL TESTS:    MEDICATIONS  (STANDING):  atorvastatin 80 milliGRAM(s) Oral at bedtime  chlorhexidine 4% Liquid 1 Application(s) Topical once  dextrose 40% Gel 15 Gram(s) Oral once  dextrose 5%. 1000 milliLiter(s) (50 mL/Hr) IV Continuous <Continuous>  dextrose 5%. 1000 milliLiter(s) (100 mL/Hr) IV Continuous <Continuous>  dextrose 50% Injectable 25 Gram(s) IV Push once  dextrose 50% Injectable 12.5 Gram(s) IV Push once  dextrose 50% Injectable 25 Gram(s) IV Push once  glucagon  Injectable 1 milliGRAM(s) IntraMuscular once  insulin lispro (ADMELOG) corrective regimen sliding scale   SubCutaneous Before meals and at bedtime  sodium chloride 0.9%. 500 milliLiter(s) (300 mL/Hr) IV Continuous <Continuous>    MEDICATIONS  (PRN):

## 2022-02-17 VITALS — TEMPERATURE: 98 F

## 2022-02-17 LAB
ALBUMIN SERPL ELPH-MCNC: 3.9 G/DL — SIGNIFICANT CHANGE UP (ref 3.3–5)
ALP SERPL-CCNC: 144 U/L — HIGH (ref 40–120)
ALT FLD-CCNC: 17 U/L — SIGNIFICANT CHANGE UP (ref 10–45)
ANION GAP SERPL CALC-SCNC: 12 MMOL/L — SIGNIFICANT CHANGE UP (ref 5–17)
AST SERPL-CCNC: 17 U/L — SIGNIFICANT CHANGE UP (ref 10–40)
BASOPHILS # BLD AUTO: 0.05 K/UL — SIGNIFICANT CHANGE UP (ref 0–0.2)
BASOPHILS NFR BLD AUTO: 0.5 % — SIGNIFICANT CHANGE UP (ref 0–2)
BILIRUB SERPL-MCNC: 3.3 MG/DL — HIGH (ref 0.2–1.2)
BUN SERPL-MCNC: 12 MG/DL — SIGNIFICANT CHANGE UP (ref 7–23)
CALCIUM SERPL-MCNC: 8.6 MG/DL — SIGNIFICANT CHANGE UP (ref 8.4–10.5)
CHLORIDE SERPL-SCNC: 102 MMOL/L — SIGNIFICANT CHANGE UP (ref 96–108)
CHOLEST SERPL-MCNC: 118 MG/DL — SIGNIFICANT CHANGE UP
CO2 SERPL-SCNC: 25 MMOL/L — SIGNIFICANT CHANGE UP (ref 22–31)
CREAT SERPL-MCNC: 1.03 MG/DL — SIGNIFICANT CHANGE UP (ref 0.5–1.3)
EOSINOPHIL # BLD AUTO: 0.02 K/UL — SIGNIFICANT CHANGE UP (ref 0–0.5)
EOSINOPHIL NFR BLD AUTO: 0.2 % — SIGNIFICANT CHANGE UP (ref 0–6)
GLUCOSE BLDC GLUCOMTR-MCNC: 151 MG/DL — HIGH (ref 70–99)
GLUCOSE BLDC GLUCOMTR-MCNC: 175 MG/DL — HIGH (ref 70–99)
GLUCOSE SERPL-MCNC: 152 MG/DL — HIGH (ref 70–99)
HCT VFR BLD CALC: 40.1 % — SIGNIFICANT CHANGE UP (ref 39–50)
HDLC SERPL-MCNC: 35 MG/DL — LOW
HGB BLD-MCNC: 14.2 G/DL — SIGNIFICANT CHANGE UP (ref 13–17)
IMM GRANULOCYTES NFR BLD AUTO: 0.2 % — SIGNIFICANT CHANGE UP (ref 0–1.5)
LIPID PNL WITH DIRECT LDL SERPL: 67 MG/DL — SIGNIFICANT CHANGE UP
LYMPHOCYTES # BLD AUTO: 1.89 K/UL — SIGNIFICANT CHANGE UP (ref 1–3.3)
LYMPHOCYTES # BLD AUTO: 19.5 % — SIGNIFICANT CHANGE UP (ref 13–44)
MAGNESIUM SERPL-MCNC: 2.1 MG/DL — SIGNIFICANT CHANGE UP (ref 1.6–2.6)
MCHC RBC-ENTMCNC: 32.9 PG — SIGNIFICANT CHANGE UP (ref 27–34)
MCHC RBC-ENTMCNC: 35.4 GM/DL — SIGNIFICANT CHANGE UP (ref 32–36)
MCV RBC AUTO: 93 FL — SIGNIFICANT CHANGE UP (ref 80–100)
MONOCYTES # BLD AUTO: 1.34 K/UL — HIGH (ref 0–0.9)
MONOCYTES NFR BLD AUTO: 13.9 % — SIGNIFICANT CHANGE UP (ref 2–14)
NEUTROPHILS # BLD AUTO: 6.35 K/UL — SIGNIFICANT CHANGE UP (ref 1.8–7.4)
NEUTROPHILS NFR BLD AUTO: 65.7 % — SIGNIFICANT CHANGE UP (ref 43–77)
NON HDL CHOLESTEROL: 83 MG/DL — SIGNIFICANT CHANGE UP
NRBC # BLD: 0 /100 WBCS — SIGNIFICANT CHANGE UP (ref 0–0)
PHOSPHATE SERPL-MCNC: 4.4 MG/DL — SIGNIFICANT CHANGE UP (ref 2.5–4.5)
PLATELET # BLD AUTO: 274 K/UL — SIGNIFICANT CHANGE UP (ref 150–400)
POTASSIUM SERPL-MCNC: 3.8 MMOL/L — SIGNIFICANT CHANGE UP (ref 3.5–5.3)
POTASSIUM SERPL-SCNC: 3.8 MMOL/L — SIGNIFICANT CHANGE UP (ref 3.5–5.3)
PROT SERPL-MCNC: 7.3 G/DL — SIGNIFICANT CHANGE UP (ref 6–8.3)
RBC # BLD: 4.31 M/UL — SIGNIFICANT CHANGE UP (ref 4.2–5.8)
RBC # FLD: 12.5 % — SIGNIFICANT CHANGE UP (ref 10.3–14.5)
SODIUM SERPL-SCNC: 139 MMOL/L — SIGNIFICANT CHANGE UP (ref 135–145)
TRIGL SERPL-MCNC: 79 MG/DL — SIGNIFICANT CHANGE UP
WBC # BLD: 9.67 K/UL — SIGNIFICANT CHANGE UP (ref 3.8–10.5)
WBC # FLD AUTO: 9.67 K/UL — SIGNIFICANT CHANGE UP (ref 3.8–10.5)

## 2022-02-17 PROCEDURE — C1725: CPT

## 2022-02-17 PROCEDURE — C1876: CPT

## 2022-02-17 PROCEDURE — 83036 HEMOGLOBIN GLYCOSYLATED A1C: CPT

## 2022-02-17 PROCEDURE — 93308 TTE F-UP OR LMTD: CPT | Mod: 26

## 2022-02-17 PROCEDURE — U0005: CPT

## 2022-02-17 PROCEDURE — 86901 BLOOD TYPING SEROLOGIC RH(D): CPT

## 2022-02-17 PROCEDURE — 83605 ASSAY OF LACTIC ACID: CPT

## 2022-02-17 PROCEDURE — U0003: CPT

## 2022-02-17 PROCEDURE — 86850 RBC ANTIBODY SCREEN: CPT

## 2022-02-17 PROCEDURE — 82248 BILIRUBIN DIRECT: CPT

## 2022-02-17 PROCEDURE — C1874: CPT

## 2022-02-17 PROCEDURE — 85025 COMPLETE CBC W/AUTO DIFF WBC: CPT

## 2022-02-17 PROCEDURE — 85730 THROMBOPLASTIN TIME PARTIAL: CPT

## 2022-02-17 PROCEDURE — 82550 ASSAY OF CK (CPK): CPT

## 2022-02-17 PROCEDURE — C1894: CPT

## 2022-02-17 PROCEDURE — C1760: CPT

## 2022-02-17 PROCEDURE — 71045 X-RAY EXAM CHEST 1 VIEW: CPT | Mod: 26

## 2022-02-17 PROCEDURE — C1769: CPT

## 2022-02-17 PROCEDURE — 86900 BLOOD TYPING SEROLOGIC ABO: CPT

## 2022-02-17 PROCEDURE — 82962 GLUCOSE BLOOD TEST: CPT

## 2022-02-17 PROCEDURE — 80061 LIPID PANEL: CPT

## 2022-02-17 PROCEDURE — 80053 COMPREHEN METABOLIC PANEL: CPT

## 2022-02-17 PROCEDURE — 82247 BILIRUBIN TOTAL: CPT

## 2022-02-17 PROCEDURE — C1887: CPT

## 2022-02-17 PROCEDURE — 83615 LACTATE (LD) (LDH) ENZYME: CPT

## 2022-02-17 PROCEDURE — 84100 ASSAY OF PHOSPHORUS: CPT

## 2022-02-17 PROCEDURE — 82553 CREATINE MB FRACTION: CPT

## 2022-02-17 PROCEDURE — 83880 ASSAY OF NATRIURETIC PEPTIDE: CPT

## 2022-02-17 PROCEDURE — 85610 PROTHROMBIN TIME: CPT

## 2022-02-17 PROCEDURE — 71045 X-RAY EXAM CHEST 1 VIEW: CPT

## 2022-02-17 PROCEDURE — 83010 ASSAY OF HAPTOGLOBIN QUANT: CPT

## 2022-02-17 PROCEDURE — 99238 HOSP IP/OBS DSCHRG MGMT 30/<: CPT

## 2022-02-17 PROCEDURE — C2623: CPT

## 2022-02-17 PROCEDURE — 36415 COLL VENOUS BLD VENIPUNCTURE: CPT

## 2022-02-17 PROCEDURE — 93321 DOPPLER ECHO F-UP/LMTD STD: CPT

## 2022-02-17 PROCEDURE — 83735 ASSAY OF MAGNESIUM: CPT

## 2022-02-17 PROCEDURE — 93005 ELECTROCARDIOGRAM TRACING: CPT

## 2022-02-17 RX ORDER — FUROSEMIDE 40 MG
40 TABLET ORAL ONCE
Refills: 0 | Status: COMPLETED | OUTPATIENT
Start: 2022-02-17 | End: 2022-02-17

## 2022-02-17 RX ORDER — POTASSIUM CHLORIDE 20 MEQ
20 PACKET (EA) ORAL ONCE
Refills: 0 | Status: COMPLETED | OUTPATIENT
Start: 2022-02-17 | End: 2022-02-17

## 2022-02-17 RX ORDER — CARVEDILOL PHOSPHATE 80 MG/1
25 CAPSULE, EXTENDED RELEASE ORAL EVERY 12 HOURS
Refills: 0 | Status: ACTIVE | OUTPATIENT
Start: 2022-02-17 | End: 2023-01-16

## 2022-02-17 RX ORDER — SACUBITRIL AND VALSARTAN 24; 26 MG/1; MG/1
1 TABLET, FILM COATED ORAL
Refills: 0 | Status: ACTIVE | OUTPATIENT
Start: 2022-02-17 | End: 2023-01-16

## 2022-02-17 RX ORDER — ASPIRIN/CALCIUM CARB/MAGNESIUM 324 MG
1 TABLET ORAL
Qty: 0 | Refills: 0 | DISCHARGE

## 2022-02-17 RX ORDER — BUDESONIDE AND FORMOTEROL FUMARATE DIHYDRATE 160; 4.5 UG/1; UG/1
1 AEROSOL RESPIRATORY (INHALATION)
Qty: 0 | Refills: 0 | DISCHARGE

## 2022-02-17 RX ADMIN — Medication 40 MILLIGRAM(S): at 09:50

## 2022-02-17 RX ADMIN — Medication 40 MILLIGRAM(S): at 00:14

## 2022-02-17 RX ADMIN — SACUBITRIL AND VALSARTAN 1 TABLET(S): 24; 26 TABLET, FILM COATED ORAL at 09:50

## 2022-02-17 RX ADMIN — CLOPIDOGREL BISULFATE 75 MILLIGRAM(S): 75 TABLET, FILM COATED ORAL at 11:43

## 2022-02-17 RX ADMIN — CARVEDILOL PHOSPHATE 25 MILLIGRAM(S): 80 CAPSULE, EXTENDED RELEASE ORAL at 09:50

## 2022-02-17 RX ADMIN — Medication 20 MILLIEQUIVALENT(S): at 07:00

## 2022-02-17 NOTE — DISCHARGE NOTE PROVIDER - NSDCFUADDAPPT_GEN_ALL_CORE_FT
Please make an appointment with your primary care doctor for 1-2 weeks after you leave the hospital.

## 2022-02-17 NOTE — DISCHARGE NOTE NURSING/CASE MANAGEMENT/SOCIAL WORK - PATIENT PORTAL LINK FT
You can access the FollowMyHealth Patient Portal offered by [facility] by registering at the following website: [email or web address]. By joining [facility]’s FollowMyHealth portal, you will also be able to view your health information using other applications (apps) compatible with our system.

## 2022-02-17 NOTE — DISCHARGE NOTE PROVIDER - NSDCMRMEDTOKEN_GEN_ALL_CORE_FT
Aldactone 25 mg oral tablet: 1 tab(s) orally once a day  Aspirin Enteric Coated 81 mg oral delayed release tablet: 1 tab(s) orally once a day  atorvastatin 40 mg oral tablet: 1 tab(s) orally once a day  Coreg 25 mg oral tablet: 1 tab(s) orally 2 times a day  Eliquis 5 mg oral tablet: 1 tab(s) orally 2 times a day  Entresto 49 mg-51 mg oral tablet: 1 tab(s) orally 2 times a day  Lasix 40 mg oral tablet: 1 tab(s) orally once a day  metOLazone 2.5 mg oral tablet: 1 tab(s) orally once a day  Plavix 75 mg oral tablet: 1 tab(s) orally once a day  Symbicort 160 mcg-4.5 mcg/inh inhalation aerosol: 1 puff(s) inhaled 2 times a day   Aldactone 25 mg oral tablet: 1 tab(s) orally once a day  atorvastatin 40 mg oral tablet: 1 tab(s) orally once a day  Coreg 25 mg oral tablet: 1 tab(s) orally 2 times a day  Eliquis 5 mg oral tablet: 1 tab(s) orally 2 times a day  Entresto 49 mg-51 mg oral tablet: 1 tab(s) orally 2 times a day  Lasix 40 mg oral tablet: 1 tab(s) orally once a day  metOLazone 2.5 mg oral tablet: 1 tab(s) orally once a day  Plavix 75 mg oral tablet: 1 tab(s) orally once a day

## 2022-02-17 NOTE — DISCHARGE NOTE PROVIDER - NSDCCPCAREPLAN_GEN_ALL_CORE_FT
PRINCIPAL DISCHARGE DIAGNOSIS  Diagnosis: Peripheral arterial disease with history of revascularization  Assessment and Plan of Treatment:       SECONDARY DISCHARGE DIAGNOSES  Diagnosis: HTN (hypertension)  Assessment and Plan of Treatment: Hypertension is the medical term for high blood pressure. Blood pressure refers to the pressure that blood applies to the inner walls of the arteries. Arteries carry blood from the heart to other organs and parts of the body. Untreated high blood pressure increases the strain on the heart and arteries, eventually causing organ damage. High blood pressure increases the risk of heart failure, heart attack (myocardial infarction), stroke, and kidney failure. High blood pressure does not usually cause any symptoms. Treatment of hypertension usually begins with lifestyle changes. Making these lifestyle changes involves little or no risk. Recommended changes often include reducing the amount of salt in your diet, losing weight if you are overweight or obese, avoiding drinking too much alcohol, stopping smoking and exercising at least 30 minutes per day most days of the week. If you are prescribed medication for your hypertension it is important to take these as prescribed to prevent the possible complications of uncontrolled hypertension.  Please continue taking the following medications:       PRINCIPAL DISCHARGE DIAGNOSIS  Diagnosis: Peripheral arterial disease with history of revascularization  Assessment and Plan of Treatment:   -Your procedure was done through your groin    -You do not need to keep this area covered and you may shower   -Please avoid any heavy lifting  (no more than 3 to 5 lbs) or strenuous activity for five days   -If you develop any swelling, bleeding, hardening of the skin (hematoma formation), acute pain, numbness/tingling  in your leg please contact your doctor immediately or call our 24/7 line: [phone]        SECONDARY DISCHARGE DIAGNOSES  Diagnosis: HTN (hypertension)  Assessment and Plan of Treatment: Hypertension is the medical term for high blood pressure. Blood pressure refers to the pressure that blood applies to the inner walls of the arteries. Arteries carry blood from the heart to other organs and parts of the body. Untreated high blood pressure increases the strain on the heart and arteries, eventually causing organ damage. High blood pressure increases the risk of heart failure, heart attack (myocardial infarction), stroke, and kidney failure. High blood pressure does not usually cause any symptoms. Treatment of hypertension usually begins with lifestyle changes. Making these lifestyle changes involves little or no risk. Recommended changes often include reducing the amount of salt in your diet, losing weight if you are overweight or obese, avoiding drinking too much alcohol, stopping smoking and exercising at least 30 minutes per day most days of the week. If you are prescribed medication for your hypertension it is important to take these as prescribed to prevent the possible complications of uncontrolled hypertension.  Please continue taking the following medications:      Diagnosis: Atrial fibrillation  Assessment and Plan of Treatment: You have atrial fibrilllation which is an irregular, often rapid heart rate that commonly causes poor blood flow. The heart's upper chambers (atria) beat out of coordination with the lower chambers (ventricles). This condition may have no symptoms, but when symptoms do appear they include palpitations, shortness of breath, and fatigue. Treatments include drugs, electrical shock (cardioversion), and minimally invasive surgery (ablation). Continue to take your blood thinner and rate controlling medications as directed. Please continue to take your medications as prescribed and follow up with your primary care doctor and cardiologist in 10-14 days.  Please continue taking the following medications:       PRINCIPAL DISCHARGE DIAGNOSIS  Diagnosis: Peripheral arterial disease with history of revascularization  Assessment and Plan of Treatment: You were admitted for a periperhal vascular procedure to help revascularize the arteries in your legs. You had stents placed in major arteries and need to continue taking the medications to ensure that the arteries remain open and do not become stenosed after the procedure.   You will need to continue taking the following medications:  Please read carefully the instructions listed below:  -Your procedure was done through your groin    -You do not need to keep this area covered and you may shower   -Please avoid any heavy lifting  (no more than 3 to 5 lbs) or strenuous activity for five days   -If you develop any swelling, bleeding, hardening of the skin (hematoma formation), acute pain, numbness/tingling  in your leg please contact your doctor immediately or call our 24/7 line: [phone]  NEVER MISS A DOSE OF ASPIRIN OR PLAVIX; IF YOU DO, YOU ARE AT RISK OF YOUR STENT CLOSING AND HAVING A HEART ATTACK. DO NOT STOP THESE TWO MEDICATIONS UNLESS INSTRUCTED TO DO SO BY YOUR CARDIOLOGIST        SECONDARY DISCHARGE DIAGNOSES  Diagnosis: HTN (hypertension)  Assessment and Plan of Treatment: Hypertension is the medical term for high blood pressure. Blood pressure refers to the pressure that blood applies to the inner walls of the arteries. Arteries carry blood from the heart to other organs and parts of the body. Untreated high blood pressure increases the strain on the heart and arteries, eventually causing organ damage. High blood pressure increases the risk of heart failure, heart attack (myocardial infarction), stroke, and kidney failure. High blood pressure does not usually cause any symptoms. Treatment of hypertension usually begins with lifestyle changes. Making these lifestyle changes involves little or no risk. Recommended changes often include reducing the amount of salt in your diet, losing weight if you are overweight or obese, avoiding drinking too much alcohol, stopping smoking and exercising at least 30 minutes per day most days of the week. If you are prescribed medication for your hypertension it is important to take these as prescribed to prevent the possible complications of uncontrolled hypertension.  Please continue taking the following medications:      Diagnosis: Atrial fibrillation  Assessment and Plan of Treatment: You have atrial fibrilllation which is an irregular, often rapid heart rate that commonly causes poor blood flow. The heart's upper chambers (atria) beat out of coordination with the lower chambers (ventricles). This condition may have no symptoms, but when symptoms do appear they include palpitations, shortness of breath, and fatigue. Treatments include drugs, electrical shock (cardioversion), and minimally invasive surgery (ablation). Continue to take your blood thinner and rate controlling medications as directed. Please continue to take your medications as prescribed and follow up with your primary care doctor and cardiologist in 10-14 days.  Please continue taking the following medications:       PRINCIPAL DISCHARGE DIAGNOSIS  Diagnosis: Peripheral arterial disease with history of revascularization  Assessment and Plan of Treatment: You were admitted for a periperhal vascular procedure to help revascularize the arteries in your legs. You had stents placed in major arteries and need to continue taking the medications to ensure that the arteries remain open and do not become stenosed after the procedure.   Please stop taking Aspirin!  You will need to continue taking the following medications for ther peripheral arterial disease stents:  1. Plavix 75mg Daily   Please read carefully the instructions listed below:  -Your procedure was done through your groin    -You do not need to keep this area covered and you may shower   -Please avoid any heavy lifting  (no more than 3 to 5 lbs) or strenuous activity for five days   -If you develop any swelling, bleeding, hardening of the skin (hematoma formation), acute pain, numbness/tingling  in your leg please contact your doctor immediately or call our 24/7 line: [phone]  NEVER MISS A DOSE OF PLAVIX; IF YOU DO, YOU ARE AT RISK OF YOUR STENT CLOSING AND HAVING A HEART ATTACK. DO NOT STOP THESE TWO MEDICATIONS UNLESS INSTRUCTED TO DO SO BY YOUR CARDIOLOGIST        SECONDARY DISCHARGE DIAGNOSES  Diagnosis: HTN (hypertension)  Assessment and Plan of Treatment: Hypertension is the medical term for high blood pressure. Blood pressure refers to the pressure that blood applies to the inner walls of the arteries. Arteries carry blood from the heart to other organs and parts of the body. Untreated high blood pressure increases the strain on the heart and arteries, eventually causing organ damage. High blood pressure increases the risk of heart failure, heart attack (myocardial infarction), stroke, and kidney failure. High blood pressure does not usually cause any symptoms. Treatment of hypertension usually begins with lifestyle changes. Making these lifestyle changes involves little or no risk. Recommended changes often include reducing the amount of salt in your diet, losing weight if you are overweight or obese, avoiding drinking too much alcohol, stopping smoking and exercising at least 30 minutes per day most days of the week. If you are prescribed medication for your hypertension it is important to take these as prescribed to prevent the possible complications of uncontrolled hypertension.  Please continue taking the following medications:  1. Coreg 5mg- Twice daily      Diagnosis: Atrial fibrillation  Assessment and Plan of Treatment: You have atrial fibrilllation which is an irregular, often rapid heart rate that commonly causes poor blood flow. The heart's upper chambers (atria) beat out of coordination with the lower chambers (ventricles). This condition may have no symptoms, but when symptoms do appear they include palpitations, shortness of breath, and fatigue. Treatments include drugs, electrical shock (cardioversion), and minimally invasive surgery (ablation). Continue to take your blood thinner and rate controlling medications as directed. Please continue to take your medications as prescribed and follow up with your primary care doctor and cardiologist in 10-14 days.  Please continue taking the following medications:  1. Eliquis 5mg- Daily      Diagnosis: HF (heart failure)  Assessment and Plan of Treatment: -Heart failure is a condition that occurs when the heart cannot pump blood as well as it should; this leads to inadequate blood flow to vital organs such as the kidneys and congestion (buildup of fluid) in other vital organs such as the lungs   -You have a history of weakened heart muscle called systolic congestive heart failure. When the heart pumps, it doesn't squeeze normally.   -Please make sure you follow up with your cardiologist within one week of discharge.    -Please weigh yourself daily: if you have gained more than 2-3 lbs in one day or 5 lbs in one week contact your doctor immediately as you may be retaining water weight   -In addition, restrict your salt intake to less than 2 grams a day   -If you develop worsening shortening of breath, leg swelling, fatigue, chest pain, difficulty sleeping at night due to shortness of breath, contact your cardiologist immediately.  Please take the following medications for heart failure:  1. Aldactone 25mg- Daily  2. Atorvastatin 40mg- Daily  3. Coreg 25mg- Twice a day  4. Entresto 49/51mg- Twice a day  5. Lasix 40mg- Daily  6. Metolazone 2.5mg- Daily       PRINCIPAL DISCHARGE DIAGNOSIS  Diagnosis: Peripheral arterial disease with history of revascularization  Assessment and Plan of Treatment: You were admitted for a periperhal vascular procedure to help revascularize the arteries in your legs. You had stents placed in major arteries and need to continue taking the medications to ensure that the arteries remain open and do not become stenosed after the procedure.   Please stop taking Aspirin!  You will need to continue taking the following medications for ther peripheral arterial disease stents:  1. Plavix 75mg Daily   Please read carefully the instructions listed below:  -Your procedure was done through your groin    -You do not need to keep this area covered and you may shower   -Please avoid any heavy lifting  (no more than 3 to 5 lbs) or strenuous activity for five days   -If you develop any swelling, bleeding, hardening of the skin (hematoma formation), acute pain, numbness/tingling  in your leg please contact your doctor immediately or call our 24/7 line: [phone]  NEVER MISS A DOSE OF PLAVIX; IF YOU DO, YOU ARE AT RISK OF YOUR STENT CLOSING. DO NOT STOP THESE MEDICATIONS UNLESS INSTRUCTED TO DO SO BY YOUR CARDIOLOGIST        SECONDARY DISCHARGE DIAGNOSES  Diagnosis: HTN (hypertension)  Assessment and Plan of Treatment: Hypertension is the medical term for high blood pressure. Blood pressure refers to the pressure that blood applies to the inner walls of the arteries. Arteries carry blood from the heart to other organs and parts of the body. Untreated high blood pressure increases the strain on the heart and arteries, eventually causing organ damage. High blood pressure increases the risk of heart failure, heart attack (myocardial infarction), stroke, and kidney failure. High blood pressure does not usually cause any symptoms. Treatment of hypertension usually begins with lifestyle changes. Making these lifestyle changes involves little or no risk. Recommended changes often include reducing the amount of salt in your diet, losing weight if you are overweight or obese, avoiding drinking too much alcohol, stopping smoking and exercising at least 30 minutes per day most days of the week. If you are prescribed medication for your hypertension it is important to take these as prescribed to prevent the possible complications of uncontrolled hypertension.  Please continue taking the following medications:  1. Coreg 5mg- Twice daily      Diagnosis: Atrial fibrillation  Assessment and Plan of Treatment: You have atrial fibrilllation which is an irregular, often rapid heart rate that commonly causes poor blood flow. The heart's upper chambers (atria) beat out of coordination with the lower chambers (ventricles). This condition may have no symptoms, but when symptoms do appear they include palpitations, shortness of breath, and fatigue. Treatments include drugs, electrical shock (cardioversion), and minimally invasive surgery (ablation). Continue to take your blood thinner and rate controlling medications as directed. Please continue to take your medications as prescribed and follow up with your primary care doctor and cardiologist in 10-14 days.  Please continue taking the following medications:  1. Eliquis 5mg- Daily      Diagnosis: HF (heart failure)  Assessment and Plan of Treatment: -Heart failure is a condition that occurs when the heart cannot pump blood as well as it should; this leads to inadequate blood flow to vital organs such as the kidneys and congestion (buildup of fluid) in other vital organs such as the lungs   -You have a history of weakened heart muscle called systolic congestive heart failure. When the heart pumps, it doesn't squeeze normally.   -Please make sure you follow up with your cardiologist within one week of discharge.    -Please weigh yourself daily: if you have gained more than 2-3 lbs in one day or 5 lbs in one week contact your doctor immediately as you may be retaining water weight   -In addition, restrict your salt intake to less than 2 grams a day   -If you develop worsening shortening of breath, leg swelling, fatigue, chest pain, difficulty sleeping at night due to shortness of breath, contact your cardiologist immediately.  Please take the following medications for heart failure:  1. Aldactone 25mg- Daily  2. Atorvastatin 40mg- Daily  3. Coreg 25mg- Twice a day  4. Entresto 49/51mg- Twice a day  5. Lasix 40mg- Daily  6. Metolazone 2.5mg- Daily       PRINCIPAL DISCHARGE DIAGNOSIS  Diagnosis: Peripheral arterial disease with history of revascularization  Assessment and Plan of Treatment: You were admitted for a periperhal vascular procedure to help revascularize the arteries in your legs. You had stents placed in major arteries and need to continue taking the medications to ensure that the arteries remain open and do not become stenosed after the procedure.   Please stop taking Aspirin!  You will need to continue taking the following medications for ther peripheral arterial disease stents:  1. Plavix 75mg Daily   Please read carefully the instructions listed below:  -Your procedure was done through your groin    -You do not need to keep this area covered and you may shower   -Please avoid any heavy lifting  (no more than 3 to 5 lbs) or strenuous activity for five days   -If you develop any swelling, bleeding, hardening of the skin (hematoma formation), acute pain, numbness/tingling  in your leg please contact your doctor immediately or call our 24/7 line: [phone]  NEVER MISS A DOSE OF PLAVIX; IF YOU DO, YOU ARE AT RISK OF YOUR STENT CLOSING. DO NOT STOP THESE MEDICATIONS UNLESS INSTRUCTED TO DO SO BY YOUR CARDIOLOGIST        SECONDARY DISCHARGE DIAGNOSES  Diagnosis: HTN (hypertension)  Assessment and Plan of Treatment: Hypertension is the medical term for high blood pressure. Blood pressure refers to the pressure that blood applies to the inner walls of the arteries. Arteries carry blood from the heart to other organs and parts of the body. Untreated high blood pressure increases the strain on the heart and arteries, eventually causing organ damage. High blood pressure increases the risk of heart failure, heart attack (myocardial infarction), stroke, and kidney failure. High blood pressure does not usually cause any symptoms. Treatment of hypertension usually begins with lifestyle changes. Making these lifestyle changes involves little or no risk. Recommended changes often include reducing the amount of salt in your diet, losing weight if you are overweight or obese, avoiding drinking too much alcohol, stopping smoking and exercising at least 30 minutes per day most days of the week. If you are prescribed medication for your hypertension it is important to take these as prescribed to prevent the possible complications of uncontrolled hypertension.  Please continue taking the following medications:  1. Coreg 5mg- Twice daily      Diagnosis: Atrial fibrillation  Assessment and Plan of Treatment: You have atrial fibrilllation which is an irregular, often rapid heart rate that commonly causes poor blood flow. The heart's upper chambers (atria) beat out of coordination with the lower chambers (ventricles). This condition may have no symptoms, but when symptoms do appear they include palpitations, shortness of breath, and fatigue. Treatments include drugs, electrical shock (cardioversion), and minimally invasive surgery (ablation). Continue to take your blood thinner and rate controlling medications as directed. Please continue to take your medications as prescribed and follow up with your primary care doctor and cardiologist in 10-14 days.  Please continue taking the following medications:  1. Eliquis 5mg- Daily      Diagnosis: HF (heart failure)  Assessment and Plan of Treatment: -Heart failure is a condition that occurs when the heart cannot pump blood as well as it should; this leads to inadequate blood flow to vital organs such as the kidneys and congestion (buildup of fluid) in other vital organs such as the lungs   -You have a history of weakened heart muscle called systolic congestive heart failure. When the heart pumps, it doesn't squeeze normally.   -Please make sure you follow up with your cardiologist within one week of discharge.    -Please weigh yourself daily: if you have gained more than 2-3 lbs in one day or 5 lbs in one week contact your doctor immediately as you may be retaining water weight   -In addition, restrict your salt intake to less than 2 grams a day   -If you develop worsening shortening of breath, leg swelling, fatigue, chest pain, difficulty sleeping at night due to shortness of breath, contact your cardiologist immediately.  Please take the following medications for heart failure:  1. Aldactone 25mg- Daily  2. Atorvastatin 40mg- Daily  3. Coreg 25mg- Twice a day  4. Entresto 49/51mg- Twice a day  5. Lasix 40mg- Daily  6. Metolazone 2.5mg- Daily      Diagnosis: DM (diabetes mellitus)  Assessment and Plan of Treatment: You were found to have an A1C value of 6.6 during your hospital stay. This puts you in the category of diabetes.   This condition results from blood sugar levels getting too high because your body is more resistant to insulin. Uncontrolled blood sugar levels can lead to kidney and heart damage, pain/numbness/paralysis in your hands and feet, and increased rates of infections. It is very important follow up with your primary care physician after you leave the hospital to come up with a plan to manage your blood sugar and prevent these serious complications of diabetes.   Your doctor might suggest adding a medication to help manage your diabetes. In addition, it is important that you maintain a healthy diet low in carbohydrates and try to increase your physical activity levels.

## 2022-02-17 NOTE — DISCHARGE NOTE NURSING/CASE MANAGEMENT/SOCIAL WORK - NSDCPEFALRISK_GEN_ALL_CORE
For information on Fall & Injury Prevention, visit: [email or web address] OR  [email or web address] OR  https://www.cdc.gov/steadi/patient.html

## 2022-02-17 NOTE — DISCHARGE NOTE PROVIDER - CARE PROVIDER_API CALL
Kristy Chen)  Cardiovascular Medicine  [address], [address]  [place] [address]  Phone: [phone]  Fax: [phone]  Established Patient  Follow Up Time:

## 2022-02-17 NOTE — DISCHARGE NOTE PROVIDER - HOSPITAL COURSE
#discharge- do not delete    72 y/o M former smoker with PMH of fib (Eliquis last dose 02/14), HLD, CAD, HFrEF, PAD (s/p LLE SFA/pop/DANIE/pop/DANIE PTA, PCI SFA, DCB SFA and pop 12/23/2021 @[facility] cardiology office; RSFA severe disease BTK) who presented to their cardiologist c/o R severe calf pain/heaviness with ambulation 1-3 blocks for days. Symptoms are impairing walking/working/performing activities of daily living.  Pt also endorsing increase dyspnea and intermittent substernal, non radiating gradually worsening chest pressure with moderate exertion for few weeks. Patient came in to hospital on 2/16 for elective peripheral vascular angiogram and had successful GE placed in Right SFA and Left external iliac. During the procedure patient became hypertensive, dyspneic and hypoxic. During this time noted to be hypertensive to the 240s systolic, tachycardic and hypervolemic (flash pulmonary edema). Patient given SL Nitroglycerin, Lasix 80mg IVP, and started on Nitroglycerin drip. Patient transferred to CCU for further management of HTN emergency and heart failure exacerbation.         Peripheral cath 12/23/21: R external iliac <30%, L external iliac 70-80%, L pSFA 100%, mSFA 80-90%, dSFA 80%, L pop 80-90% calcified, L pATA 90%, L PTA mild diffuse, L peroneal mild diffuse. Successful L sided atherectomy/PTA SFA/pop/DANIE/pop/DANIE , PCI SFA, DCB SFA and pop.             NEURO:  #AOX3: No active issues     CV:   71 Y M former smoker with PMH A.fib, HLD, CAD, HFpEF, PAD (s/p LLE SFA/pop/DANIE/pop/DANIE PTA, PCI SFA, DCB SFA and pop 12/23/2021 @[facility] cardiology office; RSFA severe disease BTK) who presents for peripheral catheterization secondary to [place] IV symptoms in the setting of known hx of PAD.     #PAD (s/p LLE SFA/pop/DANIE/pop/DANIE PTA, PCI SFA, DCB SFA and pop 12/23/2021 @[facility] cardiology office; RSFA severe disease BTK)  -s/p RSFA stent and LExt Iliac GE Stent  -c/w ASA 81mg and Plavix 75mg   -Atorvastatin 80mg qhs  -DASH diet    #HTN emergency: Patient became HTN to the 240s, dyspneic, hypoxic during vascular cath procedure  -s/p Nitroglycerin ggt   -Received 80mg IVP Lasix, cw home meds of Lasix 40mg    #A-fib: home meds Eliquis 5mg BID, EKG: Atrial fibrillation @ 81bpm, LAFB, TWI V4-V6  - c/w Eliquis     #HF with reduced EF 40%, takes Lasix   Pt with hx of HFEF, most recent echo demonstrating EF 40%.  - received Lasix 80mg IVP  - c/w home med Lasix 40mg    PULM: former smoker of 1/2-1 PPD X 30 years  #Hypoxemia 2/2 HTN emergency- resolved  - now on RA- 99%    GI: h/o Colon resection (20 years ago- unsure of etiology)   #Elevated Bili & ALP  -rule out RBC hemolysis   #Diet: DASH    RENAL/: No active issues    ENDO: HbA1c: 6.6  #Pre-Diabetic  -no intervention     #discharge- do not delete    70 y/o M former smoker with PMH of fib (Eliquis last dose 02/14), HLD, CAD, HFrEF, PAD (s/p LLE SFA/pop/DANIE/pop/DANIE PTA, PCI SFA, DCB SFA and pop 12/23/2021 @[facility] cardiology office; RSFA severe disease BTK) who presented to their cardiologist c/o R severe calf pain/heaviness with ambulation 1-3 blocks for days. Symptoms are impairing walking/working/performing activities of daily living.  Pt also endorsing increase dyspnea and intermittent substernal, non radiating gradually worsening chest pressure with moderate exertion for few weeks. Patient came in to hospital on 2/16 for elective peripheral vascular angiogram and had successful GE placed in Right SFA and Left external iliac. During the procedure patient became hypertensive, dyspneic and hypoxic. During this time noted to be hypertensive to the 240s systolic, tachycardic and hypervolemic (flash pulmonary edema). Patient given SL Nitroglycerin, Lasix 80mg IVP, and started on Nitroglycerin drip. Patient transferred to CCU for further management of HTN emergency and heart failure exacerbation.         Peripheral cath 12/23/21: R external iliac <30%, L external iliac 70-80%, L pSFA 100%, mSFA 80-90%, dSFA 80%, L pop 80-90% calcified, L pATA 90%, L PTA mild diffuse, L peroneal mild diffuse. Successful L sided atherectomy/PTA SFA/pop/DANIE/pop/DANIE , PCI SFA, DCB SFA and pop.             NEURO:  #AOX3: No active issues     CV:   71 Y M former smoker with PMH A.fib, HLD, CAD, HFpEF, PAD (s/p LLE SFA/pop/DANIE/pop/DANIE PTA, PCI SFA, DCB SFA and pop 12/23/2021 @[facility] cardiology office; RSFA severe disease BTK) who presents for peripheral catheterization secondary to [place] IV symptoms in the setting of known hx of PAD.     #PAD (s/p LLE SFA/pop/DANIE/pop/DANIE PTA, PCI SFA, DCB SFA and pop 12/23/2021 @[facility] cardiology office; RSFA severe disease BTK)  -s/p RSFA stent and LExt Iliac GE Stent  -c/w ASA 81mg and Plavix 75mg   -Atorvastatin 80mg qhs  -DASH diet    #HTN emergency: Patient became HTN to the 240s, dyspneic, hypoxic during vascular cath procedure  -s/p Nitroglycerin ggt   -Received 80mg IVP Lasix, cw home meds of Lasix 40mg    #A-fib: home meds Eliquis 5mg BID, EKG: Atrial fibrillation @ 81bpm, LAFB, TWI V4-V6  - c/w Eliquis     #HF with reduced EF 40%, takes Lasix   Pt with hx of HFEF, most recent echo demonstrating EF 40%.  - received Lasix 80mg IVP  - c/w home med Lasix 40mg    PULM: former smoker of 1/2-1 PPD X 30 years  #Hypoxemia 2/2 HTN emergency- resolved  - now on RA- 99%    GI: h/o Colon resection (20 years ago- unsure of etiology)   #Elevated Bili & ALP  -rule out RBC hemolysis   #Diet: DASH    RENAL/: No active issues    ENDO: HbA1c: 6.6  #Pre-Diabetic  -no intervention    Medications to be stopped on discharge:  Medications to be continued on discharge:   PE to be followed outpatient:  Labs to be followed outpatient: #discharge- do not delete    70 y/o M former smoker with PMH of fib (Eliquis last dose 02/14), HLD, CAD, HFrEF, PAD (s/p LLE SFA/pop/DANIE/pop/DANIE PTA, PCI SFA, DCB SFA and pop 12/23/2021 @[facility] cardiology office; RSFA severe disease BTK) who presented to their cardiologist c/o R severe calf pain/heaviness with ambulation 1-3 blocks for days. Symptoms are impairing walking/working/performing activities of daily living.  Pt also endorsing increase dyspnea and intermittent substernal, non radiating gradually worsening chest pressure with moderate exertion for few weeks. Patient came in to hospital on 2/16 for elective peripheral vascular angiogram and had successful GE placed in Right SFA and Left external iliac. During the procedure patient became hypertensive, dyspneic and hypoxic. During this time noted to be hypertensive to the 240s systolic, tachycardic and hypervolemic (flash pulmonary edema). Patient given SL Nitroglycerin, Lasix 80mg IVP, and started on Nitroglycerin drip. Patient transferred to CCU for further management of HTN emergency and heart failure exacerbation.     Peripheral cath 12/23/21: R external iliac <30%, L external iliac 70-80%, L pSFA 100%, mSFA 80-90%, dSFA 80%, L pop 80-90% calcified, L pATA 90%, L PTA mild diffuse, L peroneal mild diffuse. Successful L sided atherectomy/PTA SFA/pop/DANIE/pop/DANIE , PCI SFA, DCB SFA and pop.         NEURO:  #AOX3: No active issues     CV:   71 Y M former smoker with PMH A.fib, HLD, CAD, HFpEF, PAD (s/p LLE SFA/pop/DANIE/pop/DANIE PTA, PCI SFA, DCB SFA and pop 12/23/2021 @[facility] cardiology office; RSFA severe disease BTK) who presents for peripheral catheterization secondary to [place] IV symptoms in the setting of known hx of PAD.     #PAD (s/p LLE SFA/pop/DANIE/pop/DANIE PTA, PCI SFA, DCB SFA and pop 12/23/2021 @[facility] cardiology office; RSFA severe disease BTK)  -s/p RSFA stent and LExt Iliac GE Stent  -c/w ASA 81mg and Plavix 75mg   -Atorvastatin 80mg qhs  -DASH diet    #HTN emergency: Patient became HTN to the 240s, dyspneic, hypoxic during vascular cath procedure  -s/p Nitroglycerin ggt   -Received 80mg IVP Lasix, cw home meds of Lasix 40mg    #A-fib: home meds Eliquis 5mg BID, EKG: Atrial fibrillation @ 81bpm, LAFB, TWI V4-V6  - c/w Eliquis     #HF with reduced EF 40%, takes Lasix   Pt with hx of HFEF, most recent echo demonstrating EF 40%.  - received Lasix 80mg IVP  - c/w home med Lasix 40mg    PULM: former smoker of 1/2-1 PPD X 30 years  #Hypoxemia 2/2 HTN emergency- resolved  - now on RA- 99%    GI: h/o Colon resection (20 years ago- unsure of etiology)   #Elevated Bili & ALP  -rule out RBC hemolysis   #Diet: DASH    RENAL/: No active issues    ENDO: HbA1c: 6.6  #Pre-Diabetic  -no intervention    Medications to be stopped on discharge:  Medications to be continued on discharge:   PE to be followed outpatient:  Labs to be followed outpatient: #discharge- do not delete    72 y/o M former smoker with PMH of fib (Eliquis last dose 02/14), HLD, CAD, HFrEF, PAD (s/p LLE SFA/pop/DANIE/pop/DANIE PTA, PCI SFA, DCB SFA and pop 12/23/2021 @[facility] cardiology office; [facility] severe disease BTK) who presented to their cardiologist c/o R severe calf pain/heaviness with ambulation 1-3 blocks for days. Symptoms are impairing walking/working/performing activities of daily living.  Pt also endorsing increase dyspnea and intermittent substernal, non radiating gradually worsening chest pressure with moderate exertion for few weeks. Patient came in to hospital on 2/16 for elective peripheral vascular angiogram and had successful GE placed in Right SFA and Left external iliac. During the procedure patient became hypertensive, dyspneic and hypoxic. During this time noted to be hypertensive to the 240s systolic, tachycardic and hypervolemic (flash pulmonary edema). Patient given SL Nitroglycerin, Lasix 80mg IVP, and started on Nitroglycerin drip. Patient transferred to CCU for further management of HTN emergency and heart failure exacerbation. Patient was taken off Nitroglycerin drip, restarted on home medications and tolerated well.     Peripheral cath 12/23/21: R external iliac <30%, L external iliac 70-80%, L pSFA 100%, mSFA 80-90%, dSFA 80%, L pop 80-90% calcified, L pATA 90%, L PTA mild diffuse, L peroneal mild diffuse. Successful L sided atherectomy/PTA SFA/pop/DANIE/pop/DANIE , PCI SFA, DCB SFA and pop.         NEURO:  #AOX3: No active issues     CV:   71 Y M former smoker with PMH A.fib, HLD, CAD, HFpEF, PAD (s/p LLE SFA/pop/DANIE/pop/DANIE PTA, PCI SFA, DCB SFA and pop 12/23/2021 @[facility] cardiology office; [facility] severe disease BTK) who presents for peripheral catheterization secondary to [place] IV symptoms in the setting of known hx of PAD.     #PAD (s/p LLE SFA/pop/DANIE/pop/DANIE PTA, PCI SFA, DCB SFA and pop 12/23/2021 @[facility] cardiology office; RSFA severe disease BTK)  -s/p RSFA stent and LExt Iliac GE Stent  -Stop ASA 81mg  -C/w Plavix 75mg   -c/w Atorvastatin 40mg qhs  -DASH diet    #HTN emergency: Patient became HTN to the 240s, dyspneic, hypoxic during vascular cath procedure  -s/p Nitroglycerin ggt   -Received 80mg IVP Lasix during cath procedure   -c/w home meds of Lasix 40mg  -C/w Coreg 25mg BID    #A-fib: home meds Eliquis 5mg BID, EKG: Atrial fibrillation @ 81bpm, LAFB, TWI V4-V6  - c/w Eliquis 5mg BID  - c/w Coreg 25mg BID     #HF with reduced EF 40%, takes Lasix   Pt with hx of HFEF, most recent echo demonstrating EF 40%.  - received Lasix 80mg IVP  - c/w home med Lasix 40mg    PULM: former smoker of 1/2-1 PPD X 30 years  #Hypoxemia 2/2 HTN emergency- resolved  - now on RA- 99%    GI: h/o Colon resection (20 years ago- unsure of etiology)   #Elevated Bili & ALP (unconjugated hyperbilirubinemia)  -rule out RBC hemolysis -haptoglobin normal, LDH normal  #Diet: DASH    RENAL/: No active issues    ENDO: HbA1c: 6.6  #Pre-Diabetic  -no intervention    Medications to be stopped on discharge: Aspirin 81mg  Medications to be continued on discharge: All except Aspirin   PE to be followed outpatient: Cardiac, Lower extremities (PAD)   Labs to be followed outpatient: CBC, BMP     Medications to be continued on discharge:  Aldactone 25mg QD  Atorvastatin 40mg QPM  Coreg 25mg BID  Eliquis 5mg BID  Entresto 49/51mg BID  Lasix 40mg QD  Metolazone 2.5mg QD  Plavix 75mg QD #discharge- do not delete    70 y/o M former smoker with PMH of fib (Eliquis last dose 02/14), HLD, CAD, HFrEF, PAD (s/p LLE SFA/pop/DANIE/pop/DANIE PTA, PCI SFA, DCB SFA and pop 12/23/2021 @[facility] cardiology office; [facility] severe disease BTK) who presented to their cardiologist c/o R severe calf pain/heaviness with ambulation 1-3 blocks for days. Symptoms are impairing walking/working/performing activities of daily living.  Pt also endorsing increase dyspnea and intermittent substernal, non radiating gradually worsening chest pressure with moderate exertion for few weeks. Patient came in to hospital on 2/16 for elective peripheral vascular angiogram and had successful GE placed in Right SFA and Left external iliac. During the procedure patient became hypertensive, dyspneic and hypoxic. During this time noted to be hypertensive to the 240s systolic, tachycardic and hypervolemic (flash pulmonary edema). Patient given SL Nitroglycerin, Lasix 80mg IVP, and started on Nitroglycerin drip. Patient transferred to CCU for further management of HTN emergency and heart failure exacerbation. Patient was taken off Nitroglycerin drip, restarted on home medications and tolerated well.     Peripheral cath 12/23/21: R external iliac <30%, L external iliac 70-80%, L pSFA 100%, mSFA 80-90%, dSFA 80%, L pop 80-90% calcified, L pATA 90%, L PTA mild diffuse, L peroneal mild diffuse. Successful L sided atherectomy/PTA SFA/pop/DANIE/pop/DANIE , PCI SFA, DCB SFA and pop.         NEURO:  #AOX3: No active issues     CV:   71 Y M former smoker with PMH A.fib, HLD, CAD, HFpEF, PAD (s/p LLE SFA/pop/DANIE/pop/DANIE PTA, PCI SFA, DCB SFA and pop 12/23/2021 @[facility] cardiology office; [facility] severe disease BTK) who presents for peripheral catheterization secondary to [place] IV symptoms in the setting of known hx of PAD.     #PAD (s/p LLE SFA/pop/DANIE/pop/DANIE PTA, PCI SFA, DCB SFA and pop 12/23/2021 @[facility] cardiology office; RSFA severe disease BTK)  -s/p RSFA stent and LExt Iliac GE Stent  -Stop ASA 81mg  -C/w Plavix 75mg   -c/w Atorvastatin 40mg qhs  -DASH diet    #HTN emergency: Patient became HTN to the 240s, dyspneic, hypoxic during vascular cath procedure  -s/p Nitroglycerin ggt   -Received 80mg IVP Lasix during cath procedure   -c/w home meds of Lasix 40mg  -C/w Coreg 25mg BID  - c/w Aldactone 25mg daily    #A-fib: home meds Eliquis 5mg BID, EKG: Atrial fibrillation @ 81bpm, LAFB, TWI V4-V6  - c/w Eliquis 5mg BID  - c/w Coreg 25mg BID     #HF with reduced EF 40%, takes Lasix   Pt with hx of HFEF, most recent echo demonstrating EF 40%.  - received Lasix 80mg IVP  - c/w home med Lasix 40mg  - c/w Aldactone 25mg daily    PULM: former smoker of 1/2-1 PPD X 30 years  #Hypoxemia 2/2 HTN emergency- resolved  - now on RA- 99%    GI: h/o Colon resection (20 years ago- unsure of etiology)   #Elevated Bili & ALP (unconjugated hyperbilirubinemia)  -rule out RBC hemolysis -haptoglobin normal, LDH normal  #Diet: DASH    RENAL/: No active issues    ENDO: HbA1c: 6.6  #Diabetes, new onset  Patient found with A1C of 6.6, which he reports is a new elevation from previously known pre-diabetic level. He has in the past brought down his A1C and risk factors through diet/lifestyle modifications. Had discussion with patient about the A1C of 6.6 now putting him in the Diabetic category, and that he will need follow up and to make a plan with his outpatient doctor.   - would benefit from SGLT2 agent given heart failure concomitant diagnosis.   - follow up with outpatient doctor and consider pharmacotherapy and/or lifestyle modifications.    Medications to be stopped on discharge: Aspirin 81mg  Medications to be continued on discharge: All except Aspirin   PE to be followed outpatient: Cardiac, Lower extremities (PAD)   Labs to be followed outpatient: CBC, BMP, A1C     Medications to be continued on discharge:  Aldactone 25mg QD  Atorvastatin 40mg QPM  Coreg 25mg BID  Eliquis 5mg BID  Entresto 49/51mg BID  Lasix 40mg QD  Metolazone 2.5mg QD  Plavix 75mg QD

## 2022-02-21 DIAGNOSIS — I50.33 ACUTE ON CHRONIC DIASTOLIC (CONGESTIVE) HEART FAILURE: ICD-10-CM

## 2022-02-21 DIAGNOSIS — I70.92 CHRONIC TOTAL OCCLUSION OF ARTERY OF THE EXTREMITIES: ICD-10-CM

## 2022-02-21 DIAGNOSIS — Z79.01 LONG TERM (CURRENT) USE OF ANTICOAGULANTS: ICD-10-CM

## 2022-02-21 DIAGNOSIS — E11.51 TYPE 2 DIABETES MELLITUS WITH DIABETIC PERIPHERAL ANGIOPATHY WITHOUT GANGRENE: ICD-10-CM

## 2022-02-21 DIAGNOSIS — Z90.49 ACQUIRED ABSENCE OF OTHER SPECIFIED PARTS OF DIGESTIVE TRACT: ICD-10-CM

## 2022-02-21 DIAGNOSIS — I73.9 PERIPHERAL VASCULAR DISEASE, UNSPECIFIED: ICD-10-CM

## 2022-02-21 DIAGNOSIS — Z87.891 PERSONAL HISTORY OF NICOTINE DEPENDENCE: ICD-10-CM

## 2022-02-21 DIAGNOSIS — I16.1 HYPERTENSIVE EMERGENCY: ICD-10-CM

## 2022-02-21 DIAGNOSIS — I70.223 ATHEROSCLEROSIS OF NATIVE ARTERIES OF EXTREMITIES WITH REST PAIN, BILATERAL LEGS: ICD-10-CM

## 2022-02-21 DIAGNOSIS — I48.91 UNSPECIFIED ATRIAL FIBRILLATION: ICD-10-CM

## 2022-03-06 PROBLEM — I73.9 PERIPHERAL VASCULAR DISEASE, UNSPECIFIED: Chronic | Status: ACTIVE | Noted: 2022-01-20

## 2022-03-06 PROBLEM — I25.10 ATHEROSCLEROTIC HEART DISEASE OF NATIVE CORONARY ARTERY WITHOUT ANGINA PECTORIS: Chronic | Status: ACTIVE | Noted: 2022-01-20

## 2022-03-06 PROBLEM — I50.32 CHRONIC DIASTOLIC (CONGESTIVE) HEART FAILURE: Chronic | Status: ACTIVE | Noted: 2022-01-20

## 2022-03-06 PROBLEM — I10 ESSENTIAL (PRIMARY) HYPERTENSION: Chronic | Status: ACTIVE | Noted: 2022-01-20

## 2022-03-10 VITALS
WEIGHT: 214.95 LBS | TEMPERATURE: 98 F | RESPIRATION RATE: 18 BRPM | HEART RATE: 81 BPM | DIASTOLIC BLOOD PRESSURE: 81 MMHG | SYSTOLIC BLOOD PRESSURE: 156 MMHG | OXYGEN SATURATION: 100 % | HEIGHT: 72 IN

## 2022-03-10 RX ORDER — CHLORHEXIDINE GLUCONATE 213 G/1000ML
1 SOLUTION TOPICAL ONCE
Refills: 0 | Status: DISCONTINUED | OUTPATIENT
Start: 2022-03-16 | End: 2022-03-30

## 2022-03-10 NOTE — H&P ADULT - HISTORY OF PRESENT ILLNESS
COVID: [facility]   Cardiologist: Dr. Chen   Pharmacy: [facility] [phone]  Escort: friend     Confirm meds on arrival     72 y/o M former smoker with PMH of fib (Eliquis last dose 02/14), HLD, CAD, HFrEF, PAD (s/p LLE SFA/pop/DANIE/pop/DANIE PTA, PCI SFA, DCB SFA and pop 12/23/2021 @[facility] cardiology office; RSFA severe disease BTK) who initially presented to their cardiologist c/o R severe calf pain/heaviness with ambulation 1-3 blocks for days. Symptoms are impairing walking/working/performing activities of daily living.  Pt also endorsing increase dyspnea and intermittent substernal, non radiating gradually worsening chest pressure with moderate exertion for few weeks. Patient came in to hospital on 2/16 for elective peripheral vascular angiogram and had successful GE placed in Right SFA and Left external iliac. During the procedure patient became hypertensive, dyspneic and hypoxic. During this time noted to be hypertensive to the 240s systolic, tachycardic and hypervolemic (flash pulmonary edema). Patient given SL Nitroglycerin, Lasix 80mg IVP, and started on Nitroglycerin drip, and transferred to CCU. Patient subsequently taken off nitro gtt, and started on home meds. Patient endorses compliance with DAPT therapy and tolerating well.     Patient now returns for cardiac catheterization with possible intervention if clinically indicated to r/o progression of CAD.      COVID: [facility]   Cardiologist: Dr. Chen   Pharmacy: [email or web address] [phone]  Escort: friend     Confirm meds on arrival     70 y/o M former smoker with PMH of fib (Eliquis last dose ----), HLD, CAD, HFrEF, PAD (s/p LLE SFA/pop/DANIE/pop/DANIE PTA, PCI SFA, DCB SFA and pop 12/23/2021 @[facility] cardiology office; RSFA severe disease BTK) who initially presented to their cardiologist c/o R severe calf pain/heaviness with ambulation 1-3 blocks for days. Symptoms are impairing walking/working/performing activities of daily living.  Pt also endorsing increase dyspnea and intermittent substernal, non radiating gradually worsening chest pressure with moderate exertion for few weeks. Patient came in to hospital on 2/16 for elective peripheral vascular angiogram and had successful GE placed in Right SFA and Left external iliac. During the procedure patient became hypertensive, dyspneic and hypoxic. During this time noted to be hypertensive to the 240s systolic, tachycardic and hypervolemic (flash pulmonary edema). Patient given SL Nitroglycerin, Lasix 80mg IVP, and started on Nitroglycerin drip, and transferred to CCU. Patient subsequently taken off nitro gtt, and started on home meds. Patient endorses compliance with DAPT therapy and tolerating well.     Patient now returns for cardiac catheterization with possible intervention if clinically indicated to r/o progression of CAD.      COVID: [facility]:  Negative on 3/18/22  Cardiologist: Dr. Chen   Pharmacy: [facility] [phone]  Escort: friend     Confirm meds on arrival         COVID: [facility]:  Negative on 3/18/22  Cardiologist: Dr. Chen   Pharmacy: [facility] [phone]  Escort: friend     Confirm meds on arrival   72 y/o M former smoker with PMH of fib (Eliquis last dose ----), HLD, CAD, HFrEF, PAD (s/p LLE SFA/pop/DANIE/pop/DANIE PTA, PCI SFA, DCB SFA and pop 12/23/2021 @[facility] cardiology office; RSFA severe disease BTK) who initially presented to their cardiologist c/o R severe calf pain/heaviness with ambulation 1-3 blocks for days. Symptoms are impairing walking/working/performing activities of daily living.  Pt also endorsing increase dyspnea and intermittent substernal, non radiating gradually worsening chest pressure with moderate exertion for few weeks. Patient came in to hospital on 2/16 for elective peripheral vascular angiogram and had successful GE placed in Right SFA and Left external iliac. During the procedure patient became hypertensive, dyspneic and hypoxic. During this time noted to be hypertensive to the 240s systolic, tachycardic and hypervolemic (flash pulmonary edema). Patient given SL Nitroglycerin, Lasix 80mg IVP, and started on Nitroglycerin drip, and transferred to CCU. Patient subsequently taken off nitro gtt, and started on home meds. Patient endorses compliance with DAPT therapy and tolerating well.     Patient now returns for cardiac catheterization with possible intervention if clinically indicated to r/o progression of CAD.          COVID: [facility]:  Negative on 3/18/22  Cardiologist: Dr. Chen   Pharmacy: [facility] [phone]  Escort: friend     70 y/o M former smoker with PMH of Atrial fibrillation (on Eliquis last dose 3/13/22) HLD, CAD, HFrEF, PAD (s/p LLE SFA/pop/DANIE/pop/DANIE PTA, PCI SFA, DCB SFA and pop 12/23/2021 @[facility] cardiology office; RSFA severe disease BTK) who initially presented to their cardiologist c/o R severe calf pain/heaviness with ambulation 1-3 blocks for days. Symptoms are impairing walking/working/performing activities of daily living.  Pt also endorsing increase dyspnea and intermittent substernal, non radiating gradually worsening chest pressure with moderate exertion for few weeks. Patient came in to hospital on 2/16 for elective peripheral vascular angiogram and had successful GE placed in Right SFA and Left external iliac. During the procedure patient became hypertensive, dyspneic and hypoxic. During this time noted to be hypertensive to the 240s systolic, tachycardic and hypervolemic (flash pulmonary edema). Patient given SL Nitroglycerin, Lasix 80mg IVP, and started on Nitroglycerin drip, and transferred to CCU. Patient subsequently taken off nitro gtt, and started on home meds. Patient endorses compliance with DAPT therapy and tolerating well.     In light of pt's PMHX, current class III anginal symptoms. pt is recommended for cardiac catheterization with possible intervention if clinically indicated to r/o progression of CAD.

## 2022-03-10 NOTE — H&P ADULT - ASSESSMENT
72 y/o M former smoker with PMH of fib (Eliquis last dose ----), HLD, CAD, HFrEF, PAD (s/p LLE SFA/pop/DANIE/pop/DANIE PTA, PCI SFA, DCB SFA and pop 12/23/2021 @[facility] cardiology office; RSFA severe disease BTK) who initially presented to their cardiologist c/o R severe calf pain/heaviness with ambulation 1-3 blocks for days. Symptoms are impairing walking/working/performing activities of daily living.  Pt also endorsing increase dyspnea and intermittent substernal, non radiating gradually worsening chest pressure with moderate exertion for few weeks. Patient came in to hospital on 2/16 for elective peripheral vascular angiogram and had successful GE placed in Right SFA and Left external iliac. During the procedure patient became hypertensive, dyspneic and hypoxic. During this time noted to be hypertensive to the 240s systolic, tachycardic and hypervolemic (flash pulmonary edema). Patient given SL Nitroglycerin, Lasix 80mg IVP, and started on Nitroglycerin drip, and transferred to CCU. Patient subsequently taken off nitro gtt, and started on home meds. Patient endorses compliance with DAPT therapy and tolerating well.     Patient now returns for cardiac catheterization with possible intervention if clinically indicated to r/o progression of CAD.    EKG:					  ASA _____				Mallampati class: _________	            Anginal Class: _________    -Allergy Status:   -H/H = *****Pt denies BRBPR, hematuria, hematochezia, melena. Pt given ASA:  and Plavix:     -BUN/Cr = **. EF****. Euvolemic on exam. IV NS @ *****started pre procedure    Sedation Plan:   ? None   ? Moderate    ?  Deep    ?  General Anesthesia   Patient Is Suitable Candidate For Sedation?     ? Yes   ? No   ? Not Applicable     Risks & benefits of procedure and sedation and risks and benefits for the alternative therapy have been explained to the patient in layman’s terms including but not limited to: allergic reaction, bleeding, infection, arrhythmia, respiratory compromise, renal and vascular compromise, limb damage, MI, CVA, emergent CABG/Vascular Surgery and death. Informed consent obtained and in chart.   72 y/o M former smoker with PMH of fib (Eliquis last dose ----), HLD, CAD, HFrEF, PAD (s/p LLE SFA/pop/DANIE/pop/DANIE PTA, PCI SFA, DCB SFA and pop 12/23/2021 @[facility] cardiology office; [facility] severe disease BTK) who initially presented to their cardiologist c/o R severe calf pain/heaviness with ambulation 1-3 blocks for days. Symptoms are impairing walking/working/performing activities of daily living.  Pt also endorsing increase dyspnea and intermittent substernal, non radiating gradually worsening chest pressure with moderate exertion for few weeks. Patient came in to hospital on 2/16 for elective peripheral vascular angiogram and had successful GE placed in Right SFA and Left external iliac. During the procedure patient became hypertensive, dyspneic and hypoxic. During this time noted to be hypertensive to the 240s systolic, tachycardic and hypervolemic (flash pulmonary edema). Patient given SL Nitroglycerin, Lasix 80mg IVP, and started on Nitroglycerin drip, and transferred to CCU. Patient subsequently taken off nitro gtt, and started on home meds. Patient endorses compliance with DAPT therapy and tolerating well.     Patient now returns for cardiac catheterization with possible intervention if clinically indicated to r/o progression of CAD.    EKG:  AF @ 81BPM, LAFB, TWI in lateral leads				  ASA:  III				Mallampati class: II            Anginal Class: 3    -Allergy Status: NKDA  -H/H = 15.1/40.8.  Pt denies BRBPR, hematuria, hematochezia, melena. Pt loaded with ASA 325mg, took home Plavix 75mg     -BUN/Cr = 22/1.23.   EF unknown.  Mild Bibasilar rales on exam.  IV NS @ 50cc started pre procedure    Sedation Plan:  Moderate      Patient Is Suitable Candidate For Sedation:  Yes  Risks & benefits of procedure and sedation and risks and benefits for the alternative therapy have been explained to the patient in layman’s terms including but not limited to: allergic reaction, bleeding, infection, arrhythmia, respiratory compromise, renal and vascular compromise, limb damage, MI, CVA, emergent CABG/Vascular Surgery and death. Informed consent obtained and in chart.

## 2022-03-10 NOTE — H&P ADULT - RS GEN PE MLT RESP DETAILS PC
clear to auscultation bilaterally/no rales/no rhonchi/no wheezes mild bibasilar rales/no rhonchi/no wheezes

## 2022-03-10 NOTE — H&P ADULT - NSICDXPASTMEDICALHX_GEN_ALL_CORE_FT
PAST MEDICAL HISTORY:  CAD (coronary artery disease)     Chronic diastolic congestive heart failure     HTN (hypertension)     PAD (peripheral artery disease)      PAST MEDICAL HISTORY:  CAD (coronary artery disease)     Chronic atrial fibrillation     Chronic diastolic congestive heart failure     HTN (hypertension)     PAD (peripheral artery disease)

## 2022-03-16 ENCOUNTER — OUTPATIENT (OUTPATIENT)
Dept: OUTPATIENT SERVICES | Facility: HOSPITAL | Age: 72
LOS: 1 days | Discharge: HOME | End: 2022-03-16
Payer: MEDICARE

## 2022-03-16 LAB
A1C WITH ESTIMATED AVERAGE GLUCOSE RESULT: 7.7 % — HIGH (ref 4–5.6)
ALBUMIN SERPL ELPH-MCNC: 4.2 G/DL — SIGNIFICANT CHANGE UP (ref 3.3–5)
ALP SERPL-CCNC: SIGNIFICANT CHANGE UP U/L (ref 40–120)
ALT FLD-CCNC: SIGNIFICANT CHANGE UP U/L (ref 10–45)
ANION GAP SERPL CALC-SCNC: 13 MMOL/L — SIGNIFICANT CHANGE UP (ref 5–17)
APTT BLD: 30.5 SEC — SIGNIFICANT CHANGE UP (ref 27.5–35.5)
AST SERPL-CCNC: SIGNIFICANT CHANGE UP U/L (ref 10–40)
BASOPHILS # BLD AUTO: 0.08 K/UL — SIGNIFICANT CHANGE UP (ref 0–0.2)
BASOPHILS NFR BLD AUTO: 0.9 % — SIGNIFICANT CHANGE UP (ref 0–2)
BILIRUB SERPL-MCNC: 1.9 MG/DL — HIGH (ref 0.2–1.2)
BUN SERPL-MCNC: 22 MG/DL — SIGNIFICANT CHANGE UP (ref 7–23)
CALCIUM SERPL-MCNC: 9.3 MG/DL — SIGNIFICANT CHANGE UP (ref 8.4–10.5)
CHLORIDE SERPL-SCNC: 88 MMOL/L — LOW (ref 96–108)
CHOLEST SERPL-MCNC: 177 MG/DL — SIGNIFICANT CHANGE UP
CK MB CFR SERPL CALC: 1.8 NG/ML — SIGNIFICANT CHANGE UP (ref 0–6.7)
CK SERPL-CCNC: SIGNIFICANT CHANGE UP U/L (ref 30–200)
CO2 SERPL-SCNC: 30 MMOL/L — SIGNIFICANT CHANGE UP (ref 22–31)
CREAT SERPL-MCNC: 1.23 MG/DL — SIGNIFICANT CHANGE UP (ref 0.5–1.3)
EGFR: 64 ML/MIN/1.73M2 — SIGNIFICANT CHANGE UP
EOSINOPHIL # BLD AUTO: 0.14 K/UL — SIGNIFICANT CHANGE UP (ref 0–0.5)
EOSINOPHIL NFR BLD AUTO: 1.5 % — SIGNIFICANT CHANGE UP (ref 0–6)
ESTIMATED AVERAGE GLUCOSE: 174 MG/DL — HIGH (ref 68–114)
GLUCOSE SERPL-MCNC: 234 MG/DL — HIGH (ref 70–99)
HCT VFR BLD CALC: 40.8 % — SIGNIFICANT CHANGE UP (ref 39–50)
HDLC SERPL-MCNC: 38 MG/DL — LOW
HGB BLD-MCNC: 15.1 G/DL — SIGNIFICANT CHANGE UP (ref 13–17)
IMM GRANULOCYTES NFR BLD AUTO: 0.3 % — SIGNIFICANT CHANGE UP (ref 0–1.5)
INR BLD: 1.12 — SIGNIFICANT CHANGE UP (ref 0.88–1.16)
LIPID PNL WITH DIRECT LDL SERPL: 110 MG/DL — HIGH
LYMPHOCYTES # BLD AUTO: 2.71 K/UL — SIGNIFICANT CHANGE UP (ref 1–3.3)
LYMPHOCYTES # BLD AUTO: 29.9 % — SIGNIFICANT CHANGE UP (ref 13–44)
MCHC RBC-ENTMCNC: 33.6 PG — SIGNIFICANT CHANGE UP (ref 27–34)
MCHC RBC-ENTMCNC: 37 GM/DL — HIGH (ref 32–36)
MCV RBC AUTO: 90.7 FL — SIGNIFICANT CHANGE UP (ref 80–100)
MONOCYTES # BLD AUTO: 0.79 K/UL — SIGNIFICANT CHANGE UP (ref 0–0.9)
MONOCYTES NFR BLD AUTO: 8.7 % — SIGNIFICANT CHANGE UP (ref 2–14)
NEUTROPHILS # BLD AUTO: 5.32 K/UL — SIGNIFICANT CHANGE UP (ref 1.8–7.4)
NEUTROPHILS NFR BLD AUTO: 58.7 % — SIGNIFICANT CHANGE UP (ref 43–77)
NON HDL CHOLESTEROL: 139 MG/DL — HIGH
NRBC # BLD: 0 /100 WBCS — SIGNIFICANT CHANGE UP (ref 0–0)
PLATELET # BLD AUTO: 268 K/UL — SIGNIFICANT CHANGE UP (ref 150–400)
POTASSIUM SERPL-MCNC: SIGNIFICANT CHANGE UP MMOL/L (ref 3.5–5.3)
POTASSIUM SERPL-SCNC: SIGNIFICANT CHANGE UP MMOL/L (ref 3.5–5.3)
PROT SERPL-MCNC: 8.5 G/DL — HIGH (ref 6–8.3)
PROTHROM AB SERPL-ACNC: 13.3 SEC — SIGNIFICANT CHANGE UP (ref 10.5–13.4)
RBC # BLD: 4.5 M/UL — SIGNIFICANT CHANGE UP (ref 4.2–5.8)
RBC # FLD: 12.1 % — SIGNIFICANT CHANGE UP (ref 10.3–14.5)
SODIUM SERPL-SCNC: 131 MMOL/L — LOW (ref 135–145)
TRIGL SERPL-MCNC: 143 MG/DL — SIGNIFICANT CHANGE UP
WBC # BLD: 9.07 K/UL — SIGNIFICANT CHANGE UP (ref 3.8–10.5)
WBC # FLD AUTO: 9.07 K/UL — SIGNIFICANT CHANGE UP (ref 3.8–10.5)

## 2022-03-16 PROCEDURE — 82550 ASSAY OF CK (CPK): CPT

## 2022-03-16 PROCEDURE — 82553 CREATINE MB FRACTION: CPT

## 2022-03-16 PROCEDURE — 99153 MOD SED SAME PHYS/QHP EA: CPT

## 2022-03-16 PROCEDURE — 85610 PROTHROMBIN TIME: CPT

## 2022-03-16 PROCEDURE — C1769: CPT

## 2022-03-16 PROCEDURE — 93005 ELECTROCARDIOGRAM TRACING: CPT

## 2022-03-16 PROCEDURE — 83036 HEMOGLOBIN GLYCOSYLATED A1C: CPT

## 2022-03-16 PROCEDURE — 93458 L HRT ARTERY/VENTRICLE ANGIO: CPT

## 2022-03-16 PROCEDURE — 93458 L HRT ARTERY/VENTRICLE ANGIO: CPT | Mod: 26

## 2022-03-16 PROCEDURE — 80053 COMPREHEN METABOLIC PANEL: CPT

## 2022-03-16 PROCEDURE — 93010 ELECTROCARDIOGRAM REPORT: CPT

## 2022-03-16 PROCEDURE — C1887: CPT

## 2022-03-16 PROCEDURE — 85730 THROMBOPLASTIN TIME PARTIAL: CPT

## 2022-03-16 PROCEDURE — 85025 COMPLETE CBC W/AUTO DIFF WBC: CPT

## 2022-03-16 PROCEDURE — C1894: CPT

## 2022-03-16 PROCEDURE — 80061 LIPID PANEL: CPT

## 2022-03-16 PROCEDURE — 99152 MOD SED SAME PHYS/QHP 5/>YRS: CPT

## 2022-03-16 RX ORDER — CLOPIDOGREL BISULFATE 75 MG/1
1 TABLET, FILM COATED ORAL
Qty: 0 | Refills: 0 | DISCHARGE

## 2022-03-16 RX ORDER — ATORVASTATIN CALCIUM 80 MG/1
1 TABLET, FILM COATED ORAL
Qty: 0 | Refills: 0 | DISCHARGE

## 2022-03-16 RX ORDER — SACUBITRIL AND VALSARTAN 24; 26 MG/1; MG/1
1 TABLET, FILM COATED ORAL
Qty: 0 | Refills: 0 | DISCHARGE

## 2022-03-16 RX ORDER — SPIRONOLACTONE 25 MG/1
1 TABLET, FILM COATED ORAL
Qty: 0 | Refills: 0 | DISCHARGE

## 2022-03-16 RX ORDER — APIXABAN 2.5 MG/1
1 TABLET, FILM COATED ORAL
Qty: 0 | Refills: 0 | DISCHARGE

## 2022-03-16 RX ORDER — SODIUM CHLORIDE 9 MG/ML
500 INJECTION INTRAMUSCULAR; INTRAVENOUS; SUBCUTANEOUS
Refills: 0 | Status: DISCONTINUED | OUTPATIENT
Start: 2022-03-16 | End: 2022-03-16

## 2022-03-16 RX ORDER — ASPIRIN/CALCIUM CARB/MAGNESIUM 324 MG
325 TABLET ORAL ONCE
Refills: 0 | Status: COMPLETED | OUTPATIENT
Start: 2022-03-16 | End: 2022-03-16

## 2022-03-16 RX ORDER — FUROSEMIDE 40 MG
1 TABLET ORAL
Qty: 0 | Refills: 0 | DISCHARGE

## 2022-03-16 RX ORDER — CARVEDILOL PHOSPHATE 80 MG/1
1 CAPSULE, EXTENDED RELEASE ORAL
Qty: 0 | Refills: 0 | DISCHARGE

## 2022-03-16 RX ADMIN — Medication 325 MILLIGRAM(S): at 11:17

## 2022-03-16 RX ADMIN — SODIUM CHLORIDE 50 MILLILITER(S): 9 INJECTION INTRAMUSCULAR; INTRAVENOUS; SUBCUTANEOUS at 11:18

## 2022-03-16 NOTE — PROGRESS NOTE ADULT - SUBJECTIVE AND OBJECTIVE BOX
Interventional Cardiology PA SDA Discharge Note    Patient without complaints. Ambulated and voided without difficulties    Afebrile, VSS    Ext:     Right Ulnar : No hematoma, no bleeding, dressing; C/D/I    Pulses:    intact Ulnar to baseline     A/P:  72 y/o M former smoker with PMH of fib (Eliquis last dose ----), HLD, CAD, HFrEF, PAD (s/p LLE SFA/pop/DANIE/pop/DANIE PTA, PCI SFA, DCB SFA and pop 12/23/2021 @[facility] cardiology office; RSFA severe disease BTK) who initially presented to their cardiologist c/o R severe calf pain/heaviness with ambulation 1-3 blocks for days. Symptoms are impairing walking/working/performing activities of daily living.  Pt also endorsing increase dyspnea and intermittent substernal, non radiating gradually worsening chest pressure with moderate exertion for few weeks. Patient came in to hospital on 2/16 for elective peripheral vascular angiogram and had successful GE placed in Right SFA and Left external iliac. During the procedure patient became hypertensive, dyspneic and hypoxic. During this time noted to be hypertensive to the 240s systolic, tachycardic and hypervolemic (flash pulmonary edema). Patient given SL Nitroglycerin, Lasix 80mg IVP, and started on Nitroglycerin drip, and transferred to CCU. Patient subsequently taken off nitro gtt, and started on home meds. Patient endorses compliance with DAPT therapy and tolerating well. Patient now returns for cardiac catheterization with possible intervention if clinically indicated to r/o progression of CAD.    Patient is s/p cardiac cath 3/16/22: pLCx: 40%. All other arteries non-obstructive. LV gram not performed. EF: 35% as per echo.     1.	Stable for discharge as per attending Dr. Chen, after bed rest, pt voids, wrist stable and 30 minutes of ambulation.  2.	Follow-up with Cardiologist, Dr. Chen in 1-2 weeks  3.	Discharged forms signed and copies in chart

## 2022-03-22 DIAGNOSIS — I25.110 ATHEROSCLEROTIC HEART DISEASE OF NATIVE CORONARY ARTERY WITH UNSTABLE ANGINA PECTORIS: ICD-10-CM

## 2022-03-22 DIAGNOSIS — R94.39 ABNORMAL RESULT OF OTHER CARDIOVASCULAR FUNCTION STUDY: ICD-10-CM
